# Patient Record
Sex: FEMALE | Race: WHITE | NOT HISPANIC OR LATINO | Employment: OTHER | URBAN - METROPOLITAN AREA
[De-identification: names, ages, dates, MRNs, and addresses within clinical notes are randomized per-mention and may not be internally consistent; named-entity substitution may affect disease eponyms.]

---

## 2017-02-15 ENCOUNTER — HOSPITAL ENCOUNTER (OUTPATIENT)
Dept: MAMMOGRAPHY | Facility: CLINIC | Age: 62
Discharge: HOME | End: 2017-02-15
Attending: OBSTETRICS & GYNECOLOGY | Admitting: OBSTETRICS & GYNECOLOGY
Payer: OTHER GOVERNMENT

## 2017-02-15 ENCOUNTER — HOSPITAL ENCOUNTER (OUTPATIENT)
Dept: ULTRASOUND IMAGING | Facility: CLINIC | Age: 62
End: 2017-02-15
Attending: OBSTETRICS & GYNECOLOGY
Payer: OTHER GOVERNMENT

## 2017-02-15 DIAGNOSIS — N63.0 BREAST NODULE: ICD-10-CM

## 2017-02-15 PROCEDURE — 76642 ULTRASOUND BREAST LIMITED: CPT | Mod: RT

## 2017-02-15 PROCEDURE — 77061 BREAST TOMOSYNTHESIS UNI: CPT

## 2017-05-16 ENCOUNTER — HOSPITAL ENCOUNTER (OUTPATIENT)
Dept: ULTRASOUND IMAGING | Facility: CLINIC | Age: 62
Discharge: HOME | End: 2017-05-16
Attending: OBSTETRICS & GYNECOLOGY | Admitting: OBSTETRICS & GYNECOLOGY
Payer: OTHER GOVERNMENT

## 2017-05-16 DIAGNOSIS — N60.01 BREAST CYST, RIGHT: ICD-10-CM

## 2017-05-16 PROCEDURE — 76642 ULTRASOUND BREAST LIMITED: CPT | Mod: RT

## 2024-11-02 ENCOUNTER — APPOINTMENT (OUTPATIENT)
Dept: CT IMAGING | Facility: CLINIC | Age: 69
DRG: 872 | End: 2024-11-02
Attending: EMERGENCY MEDICINE
Payer: MEDICARE

## 2024-11-02 ENCOUNTER — HOSPITAL ENCOUNTER (INPATIENT)
Facility: CLINIC | Age: 69
LOS: 3 days | Discharge: HOME | DRG: 872 | End: 2024-11-05
Attending: EMERGENCY MEDICINE | Admitting: INTERNAL MEDICINE
Payer: MEDICARE

## 2024-11-02 ENCOUNTER — APPOINTMENT (OUTPATIENT)
Dept: GENERAL RADIOLOGY | Facility: CLINIC | Age: 69
DRG: 872 | End: 2024-11-02
Attending: EMERGENCY MEDICINE
Payer: MEDICARE

## 2024-11-02 DIAGNOSIS — K57.92 ACUTE DIVERTICULITIS: ICD-10-CM

## 2024-11-02 DIAGNOSIS — A41.9 SEPSIS WITHOUT ACUTE ORGAN DYSFUNCTION, DUE TO UNSPECIFIED ORGANISM (H): ICD-10-CM

## 2024-11-02 DIAGNOSIS — K59.01 SLOW TRANSIT CONSTIPATION: Primary | ICD-10-CM

## 2024-11-02 LAB
ALBUMIN SERPL BCG-MCNC: 4.5 G/DL (ref 3.5–5.2)
ALBUMIN UR-MCNC: NEGATIVE MG/DL
ALP SERPL-CCNC: 73 U/L (ref 40–150)
ALT SERPL W P-5'-P-CCNC: 28 U/L (ref 0–50)
ANION GAP SERPL CALCULATED.3IONS-SCNC: 19 MMOL/L (ref 7–15)
APPEARANCE UR: CLEAR
AST SERPL W P-5'-P-CCNC: 18 U/L (ref 0–45)
BASOPHILS # BLD AUTO: 0 10E3/UL (ref 0–0.2)
BASOPHILS NFR BLD AUTO: 0 %
BILIRUB SERPL-MCNC: 0.4 MG/DL
BILIRUB UR QL STRIP: NEGATIVE
BUN SERPL-MCNC: 16 MG/DL (ref 8–23)
CALCIUM SERPL-MCNC: 10.2 MG/DL (ref 8.8–10.4)
CHLORIDE SERPL-SCNC: 102 MMOL/L (ref 98–107)
COLOR UR AUTO: ABNORMAL
CREAT SERPL-MCNC: 0.71 MG/DL (ref 0.51–0.95)
EGFRCR SERPLBLD CKD-EPI 2021: >90 ML/MIN/1.73M2
EOSINOPHIL # BLD AUTO: 0 10E3/UL (ref 0–0.7)
EOSINOPHIL NFR BLD AUTO: 0 %
ERYTHROCYTE [DISTWIDTH] IN BLOOD BY AUTOMATED COUNT: 13.3 % (ref 10–15)
GLUCOSE SERPL-MCNC: 174 MG/DL (ref 70–99)
GLUCOSE UR STRIP-MCNC: >=1000 MG/DL
HCO3 SERPL-SCNC: 21 MMOL/L (ref 22–29)
HCT VFR BLD AUTO: 49.9 % (ref 35–47)
HGB BLD-MCNC: 16.1 G/DL (ref 11.7–15.7)
HGB UR QL STRIP: NEGATIVE
IMM GRANULOCYTES # BLD: 0.1 10E3/UL
IMM GRANULOCYTES NFR BLD: 1 %
KETONES UR STRIP-MCNC: 100 MG/DL
LACTATE SERPL-SCNC: 1.3 MMOL/L (ref 0.7–2)
LEUKOCYTE ESTERASE UR QL STRIP: NEGATIVE
LYMPHOCYTES # BLD AUTO: 1 10E3/UL (ref 0.8–5.3)
LYMPHOCYTES NFR BLD AUTO: 5 %
MCH RBC QN AUTO: 28.9 PG (ref 26.5–33)
MCHC RBC AUTO-ENTMCNC: 32.3 G/DL (ref 31.5–36.5)
MCV RBC AUTO: 89 FL (ref 78–100)
MONOCYTES # BLD AUTO: 0.6 10E3/UL (ref 0–1.3)
MONOCYTES NFR BLD AUTO: 3 %
MUCOUS THREADS #/AREA URNS LPF: PRESENT /LPF
NEUTROPHILS # BLD AUTO: 18.9 10E3/UL (ref 1.6–8.3)
NEUTROPHILS NFR BLD AUTO: 92 %
NITRATE UR QL: NEGATIVE
NRBC # BLD AUTO: 0 10E3/UL
NRBC BLD AUTO-RTO: 0 /100
PH UR STRIP: 5 [PH] (ref 5–7)
PLATELET # BLD AUTO: 289 10E3/UL (ref 150–450)
POTASSIUM SERPL-SCNC: 4.3 MMOL/L (ref 3.4–5.3)
PROT SERPL-MCNC: 7.6 G/DL (ref 6.4–8.3)
RBC # BLD AUTO: 5.58 10E6/UL (ref 3.8–5.2)
RBC URINE: <1 /HPF
SODIUM SERPL-SCNC: 142 MMOL/L (ref 135–145)
SP GR UR STRIP: 1.02 (ref 1–1.03)
SQUAMOUS EPITHELIAL: 1 /HPF
UROBILINOGEN UR STRIP-MCNC: NORMAL MG/DL
WBC # BLD AUTO: 20.6 10E3/UL (ref 4–11)
WBC URINE: <1 /HPF

## 2024-11-02 PROCEDURE — 83036 HEMOGLOBIN GLYCOSYLATED A1C: CPT | Performed by: INTERNAL MEDICINE

## 2024-11-02 PROCEDURE — 81001 URINALYSIS AUTO W/SCOPE: CPT | Performed by: EMERGENCY MEDICINE

## 2024-11-02 PROCEDURE — 96375 TX/PRO/DX INJ NEW DRUG ADDON: CPT

## 2024-11-02 PROCEDURE — 99285 EMERGENCY DEPT VISIT HI MDM: CPT | Mod: 25

## 2024-11-02 PROCEDURE — 83605 ASSAY OF LACTIC ACID: CPT | Performed by: EMERGENCY MEDICINE

## 2024-11-02 PROCEDURE — 250N000011 HC RX IP 250 OP 636: Performed by: EMERGENCY MEDICINE

## 2024-11-02 PROCEDURE — 80053 COMPREHEN METABOLIC PANEL: CPT | Performed by: EMERGENCY MEDICINE

## 2024-11-02 PROCEDURE — 96374 THER/PROPH/DIAG INJ IV PUSH: CPT | Mod: 59

## 2024-11-02 PROCEDURE — 36415 COLL VENOUS BLD VENIPUNCTURE: CPT | Performed by: EMERGENCY MEDICINE

## 2024-11-02 PROCEDURE — 99222 1ST HOSP IP/OBS MODERATE 55: CPT | Mod: AI | Performed by: INTERNAL MEDICINE

## 2024-11-02 PROCEDURE — 85025 COMPLETE CBC W/AUTO DIFF WBC: CPT | Performed by: EMERGENCY MEDICINE

## 2024-11-02 PROCEDURE — 74018 RADEX ABDOMEN 1 VIEW: CPT

## 2024-11-02 PROCEDURE — 120N000001 HC R&B MED SURG/OB

## 2024-11-02 PROCEDURE — 74177 CT ABD & PELVIS W/CONTRAST: CPT | Mod: MG

## 2024-11-02 PROCEDURE — 258N000003 HC RX IP 258 OP 636: Performed by: EMERGENCY MEDICINE

## 2024-11-02 RX ORDER — KETOROLAC TROMETHAMINE 15 MG/ML
15 INJECTION, SOLUTION INTRAMUSCULAR; INTRAVENOUS ONCE
Status: COMPLETED | OUTPATIENT
Start: 2024-11-02 | End: 2024-11-02

## 2024-11-02 RX ORDER — MAGNESIUM HYDROXIDE/ALUMINUM HYDROXICE/SIMETHICONE 120; 1200; 1200 MG/30ML; MG/30ML; MG/30ML
15 SUSPENSION ORAL ONCE
Status: COMPLETED | OUTPATIENT
Start: 2024-11-02 | End: 2024-11-03

## 2024-11-02 RX ORDER — METRONIDAZOLE 500 MG/100ML
500 INJECTION, SOLUTION INTRAVENOUS ONCE
Status: DISCONTINUED | OUTPATIENT
Start: 2024-11-02 | End: 2024-11-02

## 2024-11-02 RX ORDER — CEFEPIME HYDROCHLORIDE 1 G/1
1 INJECTION, POWDER, FOR SOLUTION INTRAMUSCULAR; INTRAVENOUS EVERY 8 HOURS
Status: DISCONTINUED | OUTPATIENT
Start: 2024-11-03 | End: 2024-11-02

## 2024-11-02 RX ORDER — AMOXICILLIN 250 MG
1 CAPSULE ORAL 2 TIMES DAILY PRN
Status: DISCONTINUED | OUTPATIENT
Start: 2024-11-02 | End: 2024-11-05 | Stop reason: HOSPADM

## 2024-11-02 RX ORDER — ONDANSETRON 2 MG/ML
4 INJECTION INTRAMUSCULAR; INTRAVENOUS EVERY 6 HOURS PRN
Status: DISCONTINUED | OUTPATIENT
Start: 2024-11-02 | End: 2024-11-05 | Stop reason: HOSPADM

## 2024-11-02 RX ORDER — ACETAMINOPHEN 325 MG/1
975 TABLET ORAL EVERY 8 HOURS PRN
Status: DISCONTINUED | OUTPATIENT
Start: 2024-11-02 | End: 2024-11-05 | Stop reason: HOSPADM

## 2024-11-02 RX ORDER — ONDANSETRON 4 MG/1
4 TABLET, ORALLY DISINTEGRATING ORAL EVERY 6 HOURS PRN
Status: DISCONTINUED | OUTPATIENT
Start: 2024-11-02 | End: 2024-11-05 | Stop reason: HOSPADM

## 2024-11-02 RX ORDER — KETOROLAC TROMETHAMINE 15 MG/ML
15 INJECTION, SOLUTION INTRAMUSCULAR; INTRAVENOUS EVERY 6 HOURS PRN
Status: DISCONTINUED | OUTPATIENT
Start: 2024-11-02 | End: 2024-11-05 | Stop reason: HOSPADM

## 2024-11-02 RX ORDER — CALCIUM CARBONATE 500 MG/1
1000 TABLET, CHEWABLE ORAL 4 TIMES DAILY PRN
Status: DISCONTINUED | OUTPATIENT
Start: 2024-11-02 | End: 2024-11-05 | Stop reason: HOSPADM

## 2024-11-02 RX ORDER — CIPROFLOXACIN 2 MG/ML
400 INJECTION, SOLUTION INTRAVENOUS EVERY 12 HOURS
Status: DISCONTINUED | OUTPATIENT
Start: 2024-11-03 | End: 2024-11-05 | Stop reason: HOSPADM

## 2024-11-02 RX ORDER — LIDOCAINE 40 MG/G
CREAM TOPICAL
Status: DISCONTINUED | OUTPATIENT
Start: 2024-11-02 | End: 2024-11-05 | Stop reason: HOSPADM

## 2024-11-02 RX ORDER — AMOXICILLIN 250 MG
2 CAPSULE ORAL 2 TIMES DAILY PRN
Status: DISCONTINUED | OUTPATIENT
Start: 2024-11-02 | End: 2024-11-05 | Stop reason: HOSPADM

## 2024-11-02 RX ORDER — IOPAMIDOL 755 MG/ML
500 INJECTION, SOLUTION INTRAVASCULAR ONCE
Status: COMPLETED | OUTPATIENT
Start: 2024-11-02 | End: 2024-11-02

## 2024-11-02 RX ORDER — CEFEPIME HYDROCHLORIDE 1 G/1
1 INJECTION, POWDER, FOR SOLUTION INTRAMUSCULAR; INTRAVENOUS ONCE
Status: COMPLETED | OUTPATIENT
Start: 2024-11-02 | End: 2024-11-02

## 2024-11-02 RX ORDER — METRONIDAZOLE 500 MG/100ML
500 INJECTION, SOLUTION INTRAVENOUS EVERY 12 HOURS
Status: DISCONTINUED | OUTPATIENT
Start: 2024-11-02 | End: 2024-11-05 | Stop reason: HOSPADM

## 2024-11-02 RX ORDER — SODIUM CHLORIDE 9 MG/ML
INJECTION, SOLUTION INTRAVENOUS CONTINUOUS
Status: DISCONTINUED | OUTPATIENT
Start: 2024-11-03 | End: 2024-11-03

## 2024-11-02 RX ADMIN — IOPAMIDOL 78 ML: 755 INJECTION, SOLUTION INTRAVENOUS at 22:14

## 2024-11-02 RX ADMIN — SODIUM CHLORIDE 1000 ML: 9 INJECTION, SOLUTION INTRAVENOUS at 23:03

## 2024-11-02 RX ADMIN — KETOROLAC TROMETHAMINE 15 MG: 15 INJECTION, SOLUTION INTRAMUSCULAR; INTRAVENOUS at 21:43

## 2024-11-02 RX ADMIN — CEFEPIME HYDROCHLORIDE 1 G: 1 INJECTION, POWDER, FOR SOLUTION INTRAMUSCULAR; INTRAVENOUS at 23:01

## 2024-11-02 ASSESSMENT — COLUMBIA-SUICIDE SEVERITY RATING SCALE - C-SSRS
1. IN THE PAST MONTH, HAVE YOU WISHED YOU WERE DEAD OR WISHED YOU COULD GO TO SLEEP AND NOT WAKE UP?: NO
2. HAVE YOU ACTUALLY HAD ANY THOUGHTS OF KILLING YOURSELF IN THE PAST MONTH?: NO
6. HAVE YOU EVER DONE ANYTHING, STARTED TO DO ANYTHING, OR PREPARED TO DO ANYTHING TO END YOUR LIFE?: NO

## 2024-11-02 ASSESSMENT — ACTIVITIES OF DAILY LIVING (ADL)
ADLS_ACUITY_SCORE: 0

## 2024-11-03 LAB
ANION GAP SERPL CALCULATED.3IONS-SCNC: 12 MMOL/L (ref 7–15)
BUN SERPL-MCNC: 14.7 MG/DL (ref 8–23)
CALCIUM SERPL-MCNC: 8.7 MG/DL (ref 8.8–10.4)
CHLORIDE SERPL-SCNC: 111 MMOL/L (ref 98–107)
CREAT SERPL-MCNC: 0.65 MG/DL (ref 0.51–0.95)
EGFRCR SERPLBLD CKD-EPI 2021: >90 ML/MIN/1.73M2
ERYTHROCYTE [DISTWIDTH] IN BLOOD BY AUTOMATED COUNT: 13.6 % (ref 10–15)
EST. AVERAGE GLUCOSE BLD GHB EST-MCNC: 189 MG/DL
GLUCOSE BLDC GLUCOMTR-MCNC: 105 MG/DL (ref 70–99)
GLUCOSE BLDC GLUCOMTR-MCNC: 112 MG/DL (ref 70–99)
GLUCOSE BLDC GLUCOMTR-MCNC: 123 MG/DL (ref 70–99)
GLUCOSE BLDC GLUCOMTR-MCNC: 136 MG/DL (ref 70–99)
GLUCOSE BLDC GLUCOMTR-MCNC: 140 MG/DL (ref 70–99)
GLUCOSE BLDC GLUCOMTR-MCNC: 143 MG/DL (ref 70–99)
GLUCOSE BLDC GLUCOMTR-MCNC: 146 MG/DL (ref 70–99)
GLUCOSE SERPL-MCNC: 130 MG/DL (ref 70–99)
HBA1C MFR BLD: 8.2 %
HCO3 SERPL-SCNC: 20 MMOL/L (ref 22–29)
HCT VFR BLD AUTO: 42.6 % (ref 35–47)
HGB BLD-MCNC: 13.3 G/DL (ref 11.7–15.7)
MCH RBC QN AUTO: 28.8 PG (ref 26.5–33)
MCHC RBC AUTO-ENTMCNC: 31.2 G/DL (ref 31.5–36.5)
MCV RBC AUTO: 92 FL (ref 78–100)
PLATELET # BLD AUTO: 227 10E3/UL (ref 150–450)
POTASSIUM SERPL-SCNC: 3.9 MMOL/L (ref 3.4–5.3)
RBC # BLD AUTO: 4.62 10E6/UL (ref 3.8–5.2)
SODIUM SERPL-SCNC: 143 MMOL/L (ref 135–145)
WBC # BLD AUTO: 14.6 10E3/UL (ref 4–11)

## 2024-11-03 PROCEDURE — 258N000003 HC RX IP 258 OP 636: Performed by: INTERNAL MEDICINE

## 2024-11-03 PROCEDURE — 36415 COLL VENOUS BLD VENIPUNCTURE: CPT | Performed by: INTERNAL MEDICINE

## 2024-11-03 PROCEDURE — 99233 SBSQ HOSP IP/OBS HIGH 50: CPT | Performed by: STUDENT IN AN ORGANIZED HEALTH CARE EDUCATION/TRAINING PROGRAM

## 2024-11-03 PROCEDURE — 85018 HEMOGLOBIN: CPT | Performed by: INTERNAL MEDICINE

## 2024-11-03 PROCEDURE — 82962 GLUCOSE BLOOD TEST: CPT

## 2024-11-03 PROCEDURE — 250N000011 HC RX IP 250 OP 636: Performed by: INTERNAL MEDICINE

## 2024-11-03 PROCEDURE — 120N000001 HC R&B MED SURG/OB

## 2024-11-03 PROCEDURE — 80048 BASIC METABOLIC PNL TOTAL CA: CPT | Performed by: INTERNAL MEDICINE

## 2024-11-03 PROCEDURE — 82947 ASSAY GLUCOSE BLOOD QUANT: CPT | Performed by: INTERNAL MEDICINE

## 2024-11-03 PROCEDURE — 250N000013 HC RX MED GY IP 250 OP 250 PS 637: Performed by: INTERNAL MEDICINE

## 2024-11-03 PROCEDURE — 250N000013 HC RX MED GY IP 250 OP 250 PS 637: Performed by: EMERGENCY MEDICINE

## 2024-11-03 PROCEDURE — 250N000012 HC RX MED GY IP 250 OP 636 PS 637: Performed by: INTERNAL MEDICINE

## 2024-11-03 RX ORDER — NICOTINE POLACRILEX 4 MG
15-30 LOZENGE BUCCAL
Status: DISCONTINUED | OUTPATIENT
Start: 2024-11-03 | End: 2024-11-05 | Stop reason: HOSPADM

## 2024-11-03 RX ORDER — LEVOTHYROXINE SODIUM 137 MCG
137 TABLET ORAL DAILY
COMMUNITY
Start: 2024-05-04

## 2024-11-03 RX ORDER — EMPAGLIFLOZIN 25 MG/1
25 TABLET, FILM COATED ORAL DAILY
COMMUNITY
Start: 2024-06-03

## 2024-11-03 RX ORDER — DEXTROSE MONOHYDRATE 25 G/50ML
25-50 INJECTION, SOLUTION INTRAVENOUS
Status: DISCONTINUED | OUTPATIENT
Start: 2024-11-03 | End: 2024-11-05 | Stop reason: HOSPADM

## 2024-11-03 RX ORDER — ATORVASTATIN CALCIUM 10 MG/1
10 TABLET, FILM COATED ORAL DAILY
COMMUNITY
Start: 2024-10-11

## 2024-11-03 RX ORDER — DEXTROSE MONOHYDRATE 25 G/50ML
25-50 INJECTION, SOLUTION INTRAVENOUS
Status: DISCONTINUED | OUTPATIENT
Start: 2024-11-03 | End: 2024-11-03

## 2024-11-03 RX ORDER — NICOTINE POLACRILEX 4 MG
15-30 LOZENGE BUCCAL
Status: DISCONTINUED | OUTPATIENT
Start: 2024-11-03 | End: 2024-11-03

## 2024-11-03 RX ADMIN — LEVOTHYROXINE SODIUM 137 MCG: 0.11 TABLET ORAL at 06:00

## 2024-11-03 RX ADMIN — ONDANSETRON 4 MG: 2 INJECTION INTRAMUSCULAR; INTRAVENOUS at 01:17

## 2024-11-03 RX ADMIN — ACETAMINOPHEN 325MG 975 MG: 325 TABLET ORAL at 09:39

## 2024-11-03 RX ADMIN — KETOROLAC TROMETHAMINE 15 MG: 15 INJECTION, SOLUTION INTRAMUSCULAR; INTRAVENOUS at 05:59

## 2024-11-03 RX ADMIN — METRONIDAZOLE 500 MG: 500 INJECTION, SOLUTION INTRAVENOUS at 11:35

## 2024-11-03 RX ADMIN — CIPROFLOXACIN 400 MG: 2 INJECTION, SOLUTION INTRAVENOUS at 13:20

## 2024-11-03 RX ADMIN — INSULIN ASPART 1 UNITS: 100 INJECTION, SOLUTION INTRAVENOUS; SUBCUTANEOUS at 00:42

## 2024-11-03 RX ADMIN — SODIUM CHLORIDE: 9 INJECTION, SOLUTION INTRAVENOUS at 02:15

## 2024-11-03 RX ADMIN — METRONIDAZOLE 500 MG: 500 INJECTION, SOLUTION INTRAVENOUS at 00:05

## 2024-11-03 RX ADMIN — SODIUM CHLORIDE 1000 ML: 9 INJECTION, SOLUTION INTRAVENOUS at 00:25

## 2024-11-03 RX ADMIN — ACETAMINOPHEN 325MG 975 MG: 325 TABLET ORAL at 01:18

## 2024-11-03 RX ADMIN — ALUMINUM HYDROXIDE, MAGNESIUM HYDROXIDE, AND DIMETHICONE 15 ML: 200; 20; 200 SUSPENSION ORAL at 00:04

## 2024-11-03 RX ADMIN — CIPROFLOXACIN 400 MG: 2 INJECTION, SOLUTION INTRAVENOUS at 00:10

## 2024-11-03 NOTE — PHARMACY-ADMISSION MEDICATION HISTORY
Pharmacy Intern Admission Medication History    Admission medication history is complete. The information provided in this note is only as accurate as the sources available at the time of the update.    Information Source(s): Patient and CareEverywhere/SureScripts via in-person    Pertinent Information: none    Changes made to PTA medication list:  Added: All  Deleted: None  Changed: None    Allergies reviewed with patient and updates made in EHR: yes- Noted allergy to penicillins- patient does not remember reaction she had to medication.     Medication History Completed By: Shruti Garcia RPH 11/3/2024 8:41 AM    PTA Med List   Medication Sig Last Dose/Taking    atorvastatin (LIPITOR) 10 MG tablet Take 10 mg by mouth daily. 11/1/2024    JARDIANCE 25 MG TABS tablet Take 25 mg by mouth daily. 11/1/2024    SYNTHROID 137 MCG tablet Take 137 mcg by mouth daily. 11/1/2024

## 2024-11-03 NOTE — PROGRESS NOTES
"[facility]    Medicine Progress Note - Hospitalist Service    Date of Admission:  11/2/2024    Assessment & Plan   Marlee Bell is a 68 year old woman with PMH NIDDM, fatty liver, remote non-alcoholic pancreatitis who was admitted on 11/2/2024 with abdominal pain, tenesmus, nausea.     CT diagnostic of colitis vs diverticulitis.  Started on flagyl, cipro with improvement.       Abdominal pain & tenesmus  Sepsis 2/2 acute diverticulitis:  Abdominal pain, tenesmus, nausea without vomiting on the day of discharge.  No prior history of same.  Chronic history of constipation.  CT A/P shows inflammation of sigmoid colon compatible with colitis or diverticulitis.  Feeling improved after 12 hours of IV abx.  -Cipro/flagyl IV  -Clear liquid diet  -Monitor abdominal exam  -Tylenol & ketorolac for pain control  -Anticipate discharge in the next 24 hours pending ongoing improvement and good PO intake    T2DM: MDSSI    Hypothyroidism: PTA synthroid          Diet: Combination Diet Clear Liquid    DVT Prophylaxis: Pneumatic Compression Devices  Stovall Catheter: Not present  Lines: None     Cardiac Monitoring: None  Code Status: Full Code      Clinically Significant Risk Factors Present on Admission          # Hyperchloremia: Highest Cl = 111 mmol/L in last 2 days, will monitor as appropriate         # Anion Gap Metabolic Acidosis: Highest Anion Gap = 19 mmol/L in last 2 days, will monitor and treat as appropriate             # DMII: A1C = 8.2 % (Ref range: <5.7 %) within past 6 months    # Overweight: Estimated body mass index is 29.97 kg/m  as calculated from the following:    Height as of this encounter: 1.549 m (5' 1\").    Weight as of this encounter: 71.9 kg (158 lb 9.6 oz).              Disposition Plan     Medically Ready for Discharge: Anticipated Tomorrow             Kamalijt Ashton DO  Hospitalist Service  [facility]  Securely message with [employer] (more info)  Text page " via [employer] Paging/Directory   ______________________________________________________________________    Interval History   NAEo.  Patient feeling improved.  Still with abdominal pain with movement.  Feels well at rest.   Small loose stool this AM.  Non-bloody.  No fevers.     Physical Exam   Vital Signs: Temp: 98.6  F (37  C) Temp src: Oral BP: 117/55 Pulse: 68   Resp: 18 SpO2: 95 % O2 Device: None (Room air)    Weight: 158 lbs 9.6 oz    General Appearance: Patient awake & alert.  No apparent distress.   Respiratory: Lungs are CTAB.  Work of breathing appears normal on room air.  Cardiovascular: Regular rate and rhythm.  No murmurs rubs or gallops.  There is no edema present.  GI: Benign.  Soft.  Non-tender.  Bowel sounds active.   Skin: No rashes or lesions exposed skin.  Neuro:  The patient is moving all extremities.  No obvious focal asymmetries.   Other: Patient is appropriate and oriented x3.     Medical Decision Making       50 MINUTES SPENT BY ME on the date of service doing chart review, history, exam, documentation & further activities per the note.      Data   ------------------------- PAST 24 HR DATA REVIEWED -----------------------------------------------    I have personally reviewed the following data over the past 24 hrs:    14.6 (H)  \   13.3   / 227     143 111 (H) 14.7 /  123 (H)   3.9 20 (L) 0.65 \     ALT: 28 AST: 18 AP: 73 TBILI: 0.4   ALB: 4.5 TOT PROTEIN: 7.6 LIPASE: N/A     TSH: N/A T4: N/A A1C: 8.2 (H)     Procal: N/A CRP: N/A Lactic Acid: 1.3         Imaging results reviewed over the past 24 hrs:   Recent Results (from the past 24 hours)   XR Abdomen 1 View    Narrative    EXAM: XR ABDOMEN 1 VIEW  LOCATION: [facility]  DATE: 11/2/2024    INDICATION: abdominal pain, no bm in a couple of days.  COMPARISON: None.      Impression    IMPRESSION: Large amount of stool throughout the colon. Bowel gas pattern is normal. Nothing for obstruction or free air. No evidence for  renal stones. Pelvic phleboliths.   CT Abdomen Pelvis w Contrast    Narrative    EXAM: CT ABDOMEN PELVIS W CONTRAST  LOCATION: [facility]  DATE: 11/2/2024    INDICATION: lower abd pain, leukocytosis, tachycardic  COMPARISON: Abdomen radiograph today. MR of the abdomen 3/21/2022.  TECHNIQUE: CT scan of the abdomen and pelvis was performed following injection of IV contrast. Multiplanar reformats were obtained. Dose reduction techniques were used.  CONTRAST: 78 mL Isovue 370    FINDINGS:   LOWER CHEST: Small hiatal hernia.    HEPATOBILIARY: Tiny benign hepatic cysts. Normal gallbladder and bile ducts.    PANCREAS: Multiple tiny cysts of the pancreas were better demonstrated on prior MRI examination but do not appear to have significantly changed.    SPLEEN: Normal.    ADRENAL GLANDS: Normal.    KIDNEYS/BLADDER: Normal.    BOWEL: Colonic diverticulosis. Wall thickening and inflammation of the mid sigmoid colon has a differential diagnosis of colitis and diverticulitis. Moderate to large volume of colonic stool. Trace free pelvic fluid. No bowel obstruction, free air or   abscess. Normal appendix.    LYMPH NODES: Normal.    VASCULATURE: Mild aortoiliac atherosclerosis. No aneurysm    PELVIC ORGANS: Normal.    MUSCULOSKELETAL: Degenerative changes and slight convex left curvature of the lumbar spine.      Impression    IMPRESSION:   1.  Wall thickening and inflammation of the sigmoid colon has a differential diagnosis of diverticulitis and colitis. Follow-up is recommended to exclude an underlying mass.   2.  Multiple tiny cysts of the pancreas better demonstrated on prior MRI examination at which time they were thought to be due to prior pancreatitis.  3.  Small hiatal hernia.

## 2024-11-03 NOTE — ED PROVIDER NOTES
Emergency Department Note      History of Present Illness     Chief Complaint   Abdominal Pain      HPI   Marlee Bell is a 68 year old female with history of hypothyroidism, type 2 diabetes mellitus, dyslipidemia, and fatty liver who presents to the ED with her daughter for evaluation of abdominal pain. The patient reports sudden onset severe lower abdominal pain this morning at 0300. Marlee states she felt like she had to pass a stool but was unable to have a bowel movement. This persisted throughout the day as well as the severe pain. She notes 3 episodes of emesis but states this was likely due to the pain not nausea. She then had small, liquid stools. Marlee adds that the abdominal pain does wax and wane. She did not take her medications today as she was unable to tolerate PO intake. Endorses leg pain behind the right knee radiating inferiorly that started yesterday. Also notes dehydration. She has not had a formed bowel movement in 2 days. Denies fever, urinary frequency, dysuria, hematuria, bloody stools, or history of diverticulitis. No previous abdominal surgeries. She is not nauseous now in the ED. Of note, patient reports she had a colonoscopy in 2022.            Independent Historian   None    Review of External Notes   Reviewed colonoscopy from 2009 with notes of diverticulosis.    Past Medical History     Medical History and Problem List   Hypothyroidism  Cyst of pancreas  Dyslipidemia  Fatty liver  Type 2 diabetes mellitus  Vitamin D deficiency     Medications   Jardiance  Synthroid   Lipitor   Rybelsus     Surgical History   Tonsillectomy   Colonoscopy     Physical Exam     Patient Vitals for the past 24 hrs:   BP Temp Temp src Pulse Resp SpO2 Height Weight   11/03/24 0017 (!) 152/70 -- -- 101 -- 96 % -- --   11/02/24 2300 135/65 -- -- -- -- -- -- --   11/02/24 2245 (!) 151/71 -- -- -- -- -- -- --   11/02/24 2230 (!) 146/72 -- -- -- -- -- -- --   11/02/24 1917 132/87 98.6  F (37  C)  "Temporal 114 18 97 % 1.549 m (5' 1\") 69.9 kg (154 lb)     Physical Exam  Nursing note and vitals reviewed.  HENT:   Mouth/Throat: Moist mucous membranes.   Eyes: EOMI, nonicteric sclera  Cardiovascular: Mild tachycardia, regular rhythm, no murmurs, rubs, or gallops  Pulmonary/Chest: Effort normal and breath sounds normal. No respiratory distress. No wheezes. No rales.   Abdominal: Soft.  Mild bilateral lower quadrant tenderness to palpation, nondistended, no guarding or rigidity.   Musculoskeletal: Normal range of motion.   Neurological: Alert. Moves all extremities spontaneously.   Skin: Skin is warm and dry. No rash noted.         Diagnostics     Lab Results   Labs Ordered and Resulted from Time of ED Arrival to Time of ED Departure   COMPREHENSIVE METABOLIC PANEL - Abnormal       Result Value    Sodium 142      Potassium 4.3      Carbon Dioxide (CO2) 21 (*)     Anion Gap 19 (*)     Urea Nitrogen 16.0      Creatinine 0.71      GFR Estimate >90      Calcium 10.2      Chloride 102      Glucose 174 (*)     Alkaline Phosphatase 73      AST 18      ALT 28      Protein Total 7.6      Albumin 4.5      Bilirubin Total 0.4     CBC WITH PLATELETS AND DIFFERENTIAL - Abnormal    WBC Count 20.6 (*)     RBC Count 5.58 (*)     Hemoglobin 16.1 (*)     Hematocrit 49.9 (*)     MCV 89      MCH 28.9      MCHC 32.3      RDW 13.3      Platelet Count 289      % Neutrophils 92      % Lymphocytes 5      % Monocytes 3      % Eosinophils 0      % Basophils 0      % Immature Granulocytes 1      NRBCs per 100 WBC 0      Absolute Neutrophils 18.9 (*)     Absolute Lymphocytes 1.0      Absolute Monocytes 0.6      Absolute Eosinophils 0.0      Absolute Basophils 0.0      Absolute Immature Granulocytes 0.1      Absolute NRBCs 0.0     ROUTINE UA WITH MICROSCOPIC REFLEX TO CULTURE - Abnormal    Color Urine Light Yellow      Appearance Urine Clear      Glucose Urine >=1000 (*)     Bilirubin Urine Negative      Ketones Urine 100 (*)     Specific " Gravity Urine 1.020      Blood Urine Negative      pH Urine 5.0      Protein Albumin Urine Negative      Urobilinogen Urine Normal      Nitrite Urine Negative      Leukocyte Esterase Urine Negative      Mucus Urine Present (*)     RBC Urine <1      WBC Urine <1      Squamous Epithelials Urine 1     HEMOGLOBIN A1C - Abnormal    Estimated Average Glucose 189 (*)     Hemoglobin A1C 8.2 (*)    LACTIC ACID WHOLE BLOOD - Normal    Lactic Acid 1.3     GLUCOSE MONITOR NURSING POCT   GLUCOSE MONITOR NURSING POCT       Imaging   CT Abdomen Pelvis w Contrast   Final Result   IMPRESSION:    1.  Wall thickening and inflammation of the sigmoid colon has a differential diagnosis of diverticulitis and colitis. Follow-up is recommended to exclude an underlying mass.    2.  Multiple tiny cysts of the pancreas better demonstrated on prior MRI examination at which time they were thought to be due to prior pancreatitis.   3.  Small hiatal hernia.      XR Abdomen 1 View   Final Result   IMPRESSION: Large amount of stool throughout the colon. Bowel gas pattern is normal. Nothing for obstruction or free air. No evidence for renal stones. Pelvic phleboliths.        Independent Interpretation   None    ED Course      Medications Administered   Medications   levothyroxine (SYNTHROID/LEVOTHROID) tablet 137 mcg (has no administration in time range)   lidocaine 1 % 0.1-1 mL (has no administration in time range)   lidocaine (LMX4) cream (has no administration in time range)   sodium chloride (PF) 0.9% PF flush 3 mL (3 mLs Intracatheter $Given 11/3/24 0014)   sodium chloride (PF) 0.9% PF flush 3 mL (has no administration in time range)   senna-docusate (SENOKOT-S/PERICOLACE) 8.6-50 MG per tablet 1 tablet (has no administration in time range)     Or   senna-docusate (SENOKOT-S/PERICOLACE) 8.6-50 MG per tablet 2 tablet (has no administration in time range)   calcium carbonate (TUMS) chewable tablet 1,000 mg (has no administration in time range)    sodium chloride 0.9% BOLUS 1,000 mL (has no administration in time range)   sodium chloride 0.9 % infusion (has no administration in time range)   acetaminophen (TYLENOL) tablet 975 mg (has no administration in time range)   ketorolac (TORADOL) injection 15 mg (has no administration in time range)   ondansetron (ZOFRAN ODT) ODT tab 4 mg (has no administration in time range)     Or   ondansetron (ZOFRAN) injection 4 mg (has no administration in time range)   metroNIDAZOLE (FLAGYL) infusion 500 mg (500 mg Intravenous $New Bag 11/3/24 0005)   ciprofloxacin (CIPRO) infusion 400 mg (400 mg Intravenous $New Bag 11/3/24 0010)   glucose gel 15-30 g (has no administration in time range)     Or   dextrose 50 % injection 25-50 mL (has no administration in time range)     Or   glucagon injection 1 mg (has no administration in time range)   insulin aspart (NovoLOG) injection (RAPID ACTING) (has no administration in time range)   ketorolac (TORADOL) injection 15 mg (15 mg Intravenous $Given 11/2/24 2143)   sodium chloride (PF) 0.9% PF flush 100 mL (59 mLs Intravenous $Given 11/2/24 2214)   iopamidol (ISOVUE-370) solution 500 mL (78 mLs Intravenous $Given 11/2/24 2214)   ceFEPIme (MAXIPIME) 1 g vial to attach to  mL bag for ADULTS or NS 50 mL bag for PEDS (0 g Intravenous Stopped 11/2/24 2349)   sodium chloride 0.9% BOLUS 1,000 mL (0 mLs Intravenous Stopped 11/3/24 0014)   alum & mag hydroxide-simethicone (MAALOX) suspension 15 mL (15 mLs Oral $Given 11/3/24 0004)         Discussion of Management   Admitting Hospitalist, Dr. Hoffmann    ED Course   ED Course as of 11/03/24 0019   Sat Nov 02, 2024 2108 I obtained history and examined the patient as noted above.    2257 I rechecked and updated patient on plan for admission.   2300 I spoke with Dr. Hoffmann, Hospitalist, regarding the patient's history and presentation in the emergency department today. Accepting patient for admission.       Additional  Documentation  None    Medical Decision Making / Diagnosis        MIPS       None    [facility]   Marlee Bell is a 68 year old female who presents with bilateral lower quadrant abdominal pain.  Broad differential considered including diverticulitis, bowel obstruction, appendicitis, ovarian pathology, among many other etiologies.  Vital signs notable for mild tachycardia.  Labs notable for significant leukocytosis.  CT is notable for diverticulitis versus colitis.  As patient is not having diarrhea, infectious colitis unlikely.  Additionally, lactic acid level is normal essentially ruling out ischemic colitis.  Symptoms may represent stercoral colitis.  Patient has penicillin allergy therefore cefepime Flagyl ordered for coverage of possible diverticulitis.  Patient meets sepsis criteria, but no evidence of severe sepsis or septic shock.  Admission indicated for further evaluation and treatment.  Patient is in agreement with this plan.  All questions answered.  Discussed with hospital service, Dr. Hoffmann, who accepts patient for admission.    Disposition   The patient was admitted to the hospital.     Diagnosis     ICD-10-CM    1. Acute diverticulitis  K57.92       2. Sepsis without acute organ dysfunction, due to unspecified organism (H)  A41.9              Scribe Disclosure:  I, Stephenie Babcock, am serving as a scribe at 9:07 PM on 11/2/2024 to document services personally performed by Alex Amaro MD based on my observations and the provider's statements to me.        Alex Amaro MD  11/03/24 [id number]

## 2024-11-03 NOTE — ED NOTES
"[facility]  ED Nurse Handoff Report    ED Chief complaint: Abdominal Pain  . ED Diagnosis:   Final diagnoses:   Acute diverticulitis   Sepsis without acute organ dysfunction, due to unspecified organism (H)       Allergies:   Allergies   Allergen Reactions    Metformin Itching and GI Disturbance    Penicillins        Code Status: unknown    Activity level - Baseline/Home:  independent.  Activity Level - Current:   independent.   Lift room needed: No.   Bariatric: No   Needed: No   Isolation: No.   Infection: Not Applicable.     Respiratory status: Room air    Vital Signs (within 30 minutes):   Vitals:    11/02/24 1917 11/02/24 2230 11/02/24 2245 11/02/24 2300   BP: 132/87 (!) 146/72 (!) 151/71 135/65   Pulse: 114      Resp: 18      Temp: 98.6  F (37  C)      TempSrc: Temporal      SpO2: 97%      Weight: 69.9 kg (154 lb)      Height: 1.549 m (5' 1\")          Cardiac Rhythm:  ,      Pain level:    Patient confused: No.   Patient Falls Risk: patient and family education.   Elimination Status: Has voided     Patient Report - Initial Complaint: Arrives from home. States she has abdominal pain, states she unable to pass a stool. LBM was 2 days ago. States has had 3 episode of nausea. Also reports back pain and leg pain. Denies fevers at home. Denies medications prior to coming. Denies urinary symptoms.     Focused Assessment: A&Ox4. VSS on RA. Denies nausea, vomiting, SOB, CP. BLQ ABD pressure pain radiating to rectum, Ketoralac given.     Abnormal Results:   Labs Ordered and Resulted from Time of ED Arrival to Time of ED Departure   COMPREHENSIVE METABOLIC PANEL - Abnormal       Result Value    Sodium 142      Potassium 4.3      Carbon Dioxide (CO2) 21 (*)     Anion Gap 19 (*)     Urea Nitrogen 16.0      Creatinine 0.71      GFR Estimate >90      Calcium 10.2      Chloride 102      Glucose 174 (*)     Alkaline Phosphatase 73      AST 18      ALT 28      Protein Total 7.6      Albumin 4.5 "      Bilirubin Total 0.4     CBC WITH PLATELETS AND DIFFERENTIAL - Abnormal    WBC Count 20.6 (*)     RBC Count 5.58 (*)     Hemoglobin 16.1 (*)     Hematocrit 49.9 (*)     MCV 89      MCH 28.9      MCHC 32.3      RDW 13.3      Platelet Count 289      % Neutrophils 92      % Lymphocytes 5      % Monocytes 3      % Eosinophils 0      % Basophils 0      % Immature Granulocytes 1      NRBCs per 100 WBC 0      Absolute Neutrophils 18.9 (*)     Absolute Lymphocytes 1.0      Absolute Monocytes 0.6      Absolute Eosinophils 0.0      Absolute Basophils 0.0      Absolute Immature Granulocytes 0.1      Absolute NRBCs 0.0     ROUTINE UA WITH MICROSCOPIC REFLEX TO CULTURE - Abnormal    Color Urine Light Yellow      Appearance Urine Clear      Glucose Urine >=1000 (*)     Bilirubin Urine Negative      Ketones Urine 100 (*)     Specific Gravity Urine 1.020      Blood Urine Negative      pH Urine 5.0      Protein Albumin Urine Negative      Urobilinogen Urine Normal      Nitrite Urine Negative      Leukocyte Esterase Urine Negative      Mucus Urine Present (*)     RBC Urine <1      WBC Urine <1      Squamous Epithelials Urine 1     LACTIC ACID WHOLE BLOOD - Normal    Lactic Acid 1.3          CT Abdomen Pelvis w Contrast   Final Result   IMPRESSION:    1.  Wall thickening and inflammation of the sigmoid colon has a differential diagnosis of diverticulitis and colitis. Follow-up is recommended to exclude an underlying mass.    2.  Multiple tiny cysts of the pancreas better demonstrated on prior MRI examination at which time they were thought to be due to prior pancreatitis.   3.  Small hiatal hernia.      XR Abdomen 1 View   Final Result   IMPRESSION: Large amount of stool throughout the colon. Bowel gas pattern is normal. Nothing for obstruction or free air. No evidence for renal stones. Pelvic phleboliths.          Treatments provided: see imaging, labs, mar  Family Comments: daughter   OBS brochure/video discussed/provided to  patient:  No  ED Medications:   Medications   ceFEPIme (MAXIPIME) 1 g vial to attach to  mL bag for ADULTS or NS 50 mL bag for PEDS (1 g Intravenous $New Bag 11/2/24 2301)   metroNIDAZOLE (FLAGYL) infusion 500 mg (has no administration in time range)   sodium chloride 0.9% BOLUS 1,000 mL (1,000 mLs Intravenous $New Bag 11/2/24 2303)   ketorolac (TORADOL) injection 15 mg (15 mg Intravenous $Given 11/2/24 2143)   sodium chloride (PF) 0.9% PF flush 100 mL (59 mLs Intravenous $Given 11/2/24 2214)   iopamidol (ISOVUE-370) solution 500 mL (78 mLs Intravenous $Given 11/2/24 2214)       Drips infusing:  No  For the majority of the shift this patient was Green.   Interventions performed were n/a.    Sepsis treatment initiated: No    Cares/treatment/interventions/medications to be completed following ED care: see in-pt orders    ED Nurse Name: Michelle Gutiérrez RN  11:17 PM    RECEIVING UNIT ED HANDOFF REVIEW    Above ED Nurse Handoff Report was reviewed: Yes  Reviewed by: Mayra Scott RN on November 3, 2024 at 12:58 AM   I Danilo called the ED to inform them the note was read: Yes

## 2024-11-03 NOTE — PLAN OF CARE
"/66 (BP Location: Right arm)   Pulse 80   Temp 98.6  F (37  C) (Oral)   Resp 16   Ht 1.549 m (5' 1\")   Wt 71.9 kg (158 lb 9.6 oz)   SpO2 95%   BMI 29.97 kg/m      VSS on RA. Reports pain. Declined pain medications. PIV to right w/ NS at 100 ml/hr. Aox4. LS clear. Denies SOB and CP. No edema noted. Reports some abdominal pain. BS +. Last BM 11/3. Voiding spontaneously. Skin WDL. Clear liquid diet. Independent. B, 136. Plan: IVF. IV Cipro and IV Flagyl. Continue w/ POC.     Goal Outcome Evaluation:      Plan of Care Reviewed With: patient    Overall Patient Progress: improvingOverall Patient Progress: improving    Outcome Evaluation: Pt had x2 BMs. BG within range.      Problem: Adult Inpatient Plan of Care  Goal: Plan of Care Review  Description: The Plan of Care Review/Shift note should be completed every shift.  The Outcome Evaluation is a brief statement about your assessment that the patient is improving, declining, or no change.  This information will be displayed automatically on your shift  note.  Outcome: Progressing  Flowsheets (Taken 11/3/2024 0224)  Outcome Evaluation: Pt had x2 BMs. BG within range.  Plan of Care Reviewed With: patient  Overall Patient Progress: improving  Goal: Patient-Specific Goal (Individualized)  Description: You can add care plan individualizations to a care plan. Examples of Individualization might be:  \"Parent requests to be called daily at 9am for status\", \"I have a hard time hearing out of my right ear\", or \"Do not touch me to wake me up as it startles  me\".  Outcome: Progressing  Goal: Absence of Hospital-Acquired Illness or Injury  Outcome: Progressing  Intervention: Identify and Manage Fall Risk  Recent Flowsheet Documentation  Taken 11/3/2024 0202 by Mayra Scott, RN  Safety Promotion/Fall Prevention: safety round/check completed  Intervention: Prevent Skin Injury  Recent Flowsheet Documentation  Taken 11/3/2024 0202 by Mayra Scott, RN  Body " Position: position changed independently  Intervention: Prevent and Manage VTE (Venous Thromboembolism) Risk  Recent Flowsheet Documentation  Taken 11/3/2024 0202 by Mayra Scott RN  VTE Prevention/Management:   SCDs off (sequential compression devices)   patient refused intervention  Intervention: Prevent Infection  Recent Flowsheet Documentation  Taken 11/3/2024 0202 by Mayra Scott, RN  Infection Prevention:   single patient room provided   rest/sleep promoted   hand hygiene promoted   personal protective equipment utilized   equipment surfaces disinfected   environmental surveillance performed   cohorting utilized  Goal: Optimal Comfort and Wellbeing  Outcome: Progressing  Intervention: Monitor Pain and Promote Comfort  Recent Flowsheet Documentation  Taken 11/3/2024 0202 by Mayra Scott RN  Pain Management Interventions: declines  Goal: Readiness for Transition of Care  Outcome: Progressing  Intervention: Mutually Develop Transition Plan  Recent Flowsheet Documentation  Taken 11/3/2024 0156 by Myara Scott RN  Equipment Currently Used at Home: none     Problem: Bowel Elimination Management  Goal: Effective Bowel Elimination/Continence  Outcome: Progressing     Problem: Comorbidity Management  Goal: Blood Glucose Levels Within Targeted Range  Outcome: Progressing  Intervention: Monitor and Manage Glycemia  Recent Flowsheet Documentation  Taken 11/3/2024 0202 by Mayra Scott, RN  Medication Review/Management: medications reviewed

## 2024-11-03 NOTE — ED TRIAGE NOTES
Arrives from home. States she has abdominal pain, states she unable to pass a stool. LBM was 2 days ago.     States has had 3 episode of nausea.     Also reports back pain and leg pain.     Denies fevers at home. Denies medications prior to coming. Denies urinary symptoms.

## 2024-11-03 NOTE — H&P
"[facility] History and Physical    Marlee Bell MRN# [id number]   Age: 68 year old YOB: 1955     Date of Admission:  11/2/2024    Home clinic: [facility]  Primary care provider: Inez Lyon          Assessment and Plan:   Assessment:   Marlee Bell is a 68 year old woman who came to attention today due to tr pain and being \"unable to pass a stool\". She also reported significant nausea without vomiting.     PMH is notable for NIDDM, fatty liver, remote non-alcoholic pancreatitis.  Notably, the pt had a colonoscopy in 2022 when several small, adenomatous polyps were removed.     On presentation to the ED, VS: 132/87, , RR 18, T 98.6.  Exam: alert and well-nourished woman in NAD. Some discomfort intermittently and with direct palpation in the left lower quadrant without rebound or guarding.  Labs: Creat 0.71, normal electrolytes and LFTs. Glucose 174.  WBC 20.6, Hgb 16.1, Plt 289. UA negative.   Imaging: CT Abd/pelvis with contrast: \"Wall thickening and inflammation of the sigmoid colon has a differential diagnosis of diverticulitis and colitis. Follow-up is recommended to exclude an underlying mass.\"    DX:  Acute diverticulosis. CT findings are apparently not conclusive, but this appears very consistent with sigmoid diverticulitis.   Sepsis (based on WBC and tachycardia). Mild associated dehydration.   NIDDM.  Controlled on Jardiance 25 mg daily, but this is held at this time now that she is   Hypothyroidism.      Plan:   Admit to inpatient.   Ciprofloxacin and Metronidazole IV.    Will give 1 L NS bolus and NS infusion at 100 ml/hr for a liter. Also encourage clear liquids.   Consider repeat imaging if not markedly improved in the next several days.   ISS.             Chief Complaint:     Lower abd pain x 3 days     History is obtained from the patient, electronic health record, and emergency department physician     Marlee Bell reports " that she first noted suprapubic area pain about 3 days PTA.  The pain is intermittent but often very intense to the point where she has vomited due to the severity.  When the pain is worst, it feels as though she needs to pass a stool.  When she sits on the toilet, she is unable to pass any more than a small BM, but denies passing any blood or significant diarrhea. The volume of apparent formed stool has been very small.    No F/S/C.  Reports anorexia and emesis x 3 in the past day. No dizziness. Denies pain with urinating. Scant stool output, no blood or diarrhea. No vaginal bleeding.         Past Medical History:   NIDDM  Hypothyroidism  HLD         Past Surgical History:    No abdominal or pelvic surgeries.         Social History:   Non-smoker, non-drinker.          Family History:   No fam hx of colorectal cancer          Allergies:     Allergies   Allergen Reactions    Metformin Itching and GI Disturbance    Penicillins              Medications:   Jardiance 25 mg daily  Rosuvastatin 10 mg daily  Synthroid (PARMINDER) 137 mcg daily         Review of Systems:     A comprehensive review of systems was performed and found to be negative except as described in this note           Physical Exam:   Vitals were reviewed  Temp: 98.6  F (37  C) Temp src: Temporal BP: 135/65 Pulse: 114   Resp: 18 SpO2: 97 % O2 Device: None (Room air)    Constitutional: Awake, alert, cooperative, no apparent distress, and appears stated age.  Eyes: Lids and lashes normal, extra ocular muscles intact, sclera clear, conjunctiva normal.  ENT: Normocephalic, without obvious abnormality, atraumatic.  Neck: Supple, symmetrical, trachea midline, no adenopathy, thyroid symmetric, not enlarged and no tenderness, skin normal.  Hematologic / Lymphatic: No cervical lymphadenopathy and no supraclavicular lymphadenopathy.  Lungs: No increased work of breathing, good air exchange, clear to auscultation bilaterally, no crackles or wheezing.  Cardiovascular:  Regular rate and rhythm, normal S1 and S2, no S3 or S4, and no murmur noted.  Abdomen: Decreased bowel sounds, soft.  Suprapubic area pain, more prominent on the left htna th right. No rebound or guarding.   Musculoskeletal: No redness, warmth, or swelling of the joints. Tone is normal.  Neurologic: Awake, alert, oriented to name, place and time.  Cranial nerves II-XII are grossly intact.  Neuropsychiatric: Normal affect, mood, orientation, memory and insight.  Skin: No rashes, erythema, pallor, petechia or purpura.          Data:     Results for orders placed or performed during the hospital encounter of 11/02/24 (from the past 24 hours)   Comprehensive metabolic panel   Result Value Ref Range    Sodium 142 135 - 145 mmol/L    Potassium 4.3 3.4 - 5.3 mmol/L    Carbon Dioxide (CO2) 21 (L) 22 - 29 mmol/L    Anion Gap 19 (H) 7 - 15 mmol/L    Urea Nitrogen 16.0 8.0 - 23.0 mg/dL    Creatinine 0.71 0.51 - 0.95 mg/dL    GFR Estimate >90 >60 mL/min/1.73m2    Calcium 10.2 8.8 - 10.4 mg/dL    Chloride 102 98 - 107 mmol/L    Glucose 174 (H) 70 - 99 mg/dL    Alkaline Phosphatase 73 40 - 150 U/L    AST 18 0 - 45 U/L    ALT 28 0 - 50 U/L    Protein Total 7.6 6.4 - 8.3 g/dL    Albumin 4.5 3.5 - 5.2 g/dL    Bilirubin Total 0.4 <=1.2 mg/dL   CBC with Platelets & Differential    Narrative    The following orders were created for panel order CBC with Platelets & Differential.  Procedure                               Abnormality         Status                     ---------                               -----------         ------                     CBC with platelets and d...[[id number]]  Abnormal            Final result                 Please view results for these tests on the individual orders.   CBC with platelets and differential   Result Value Ref Range    WBC Count 20.6 (H) 4.0 - 11.0 10e3/uL    RBC Count 5.58 (H) 3.80 - 5.20 10e6/uL    Hemoglobin 16.1 (H) 11.7 - 15.7 g/dL    Hematocrit 49.9 (H) 35.0 - 47.0 %    MCV 89 78 - 100  fL    MCH 28.9 26.5 - 33.0 pg    MCHC 32.3 31.5 - 36.5 g/dL    RDW 13.3 10.0 - 15.0 %    Platelet Count 289 150 - 450 10e3/uL    % Neutrophils 92 %    % Lymphocytes 5 %    % Monocytes 3 %    % Eosinophils 0 %    % Basophils 0 %    % Immature Granulocytes 1 %    NRBCs per 100 WBC 0 <1 /100    Absolute Neutrophils 18.9 (H) 1.6 - 8.3 10e3/uL    Absolute Lymphocytes 1.0 0.8 - 5.3 10e3/uL    Absolute Monocytes 0.6 0.0 - 1.3 10e3/uL    Absolute Eosinophils 0.0 0.0 - 0.7 10e3/uL    Absolute Basophils 0.0 0.0 - 0.2 10e3/uL    Absolute Immature Granulocytes 0.1 <=0.4 10e3/uL    Absolute NRBCs 0.0 10e3/uL   XR Abdomen 1 View    Narrative    EXAM: XR ABDOMEN 1 VIEW  LOCATION: [facility]  DATE: 11/2/2024    INDICATION: abdominal pain, no bm in a couple of days.  COMPARISON: None.      Impression    IMPRESSION: Large amount of stool throughout the colon. Bowel gas pattern is normal. Nothing for obstruction or free air. No evidence for renal stones. Pelvic phleboliths.   Extra Tube ([place] Draw) *Canceled*    Narrative    The following orders were created for panel order Extra Tube ([place] Draw).  Procedure                               Abnormality         Status                     ---------                               -----------         ------                       Please view results for these tests on the individual orders.   UA with Microscopic reflex to Culture    Specimen: Urine, Clean Catch   Result Value Ref Range    Color Urine Light Yellow Colorless, Straw, Light Yellow, Yellow    Appearance Urine Clear Clear    Glucose Urine >=1000 (A) Negative mg/dL    Bilirubin Urine Negative Negative    Ketones Urine 100 (A) Negative mg/dL    Specific Gravity Urine 1.020 1.003 - 1.035    Blood Urine Negative Negative    pH Urine 5.0 5.0 - 7.0    Protein Albumin Urine Negative Negative mg/dL    Urobilinogen Urine Normal Normal, 2.0 mg/dL    Nitrite Urine Negative Negative    Leukocyte Esterase Urine  Negative Negative    Mucus Urine Present (A) None Seen /LPF    RBC Urine <1 <=2 /HPF    WBC Urine <1 <=5 /HPF    Squamous Epithelials Urine 1 <=1 /HPF    Narrative    Urine Culture not indicated   Lactic acid whole blood   Result Value Ref Range    Lactic Acid 1.3 0.7 - 2.0 mmol/L   CT Abdomen Pelvis w Contrast    Narrative    EXAM: CT ABDOMEN PELVIS W CONTRAST  LOCATION: [facility]  DATE: 11/2/2024    INDICATION: lower abd pain, leukocytosis, tachycardic  COMPARISON: Abdomen radiograph today. MR of the abdomen 3/21/2022.  TECHNIQUE: CT scan of the abdomen and pelvis was performed following injection of IV contrast. Multiplanar reformats were obtained. Dose reduction techniques were used.  CONTRAST: 78 mL Isovue 370    FINDINGS:   LOWER CHEST: Small hiatal hernia.    HEPATOBILIARY: Tiny benign hepatic cysts. Normal gallbladder and bile ducts.    PANCREAS: Multiple tiny cysts of the pancreas were better demonstrated on prior MRI examination but do not appear to have significantly changed.    SPLEEN: Normal.    ADRENAL GLANDS: Normal.    KIDNEYS/BLADDER: Normal.    BOWEL: Colonic diverticulosis. Wall thickening and inflammation of the mid sigmoid colon has a differential diagnosis of colitis and diverticulitis. Moderate to large volume of colonic stool. Trace free pelvic fluid. No bowel obstruction, free air or   abscess. Normal appendix.    LYMPH NODES: Normal.    VASCULATURE: Mild aortoiliac atherosclerosis. No aneurysm    PELVIC ORGANS: Normal.    MUSCULOSKELETAL: Degenerative changes and slight convex left curvature of the lumbar spine.      Impression    IMPRESSION:   1.  Wall thickening and inflammation of the sigmoid colon has a differential diagnosis of diverticulitis and colitis. Follow-up is recommended to exclude an underlying mass.   2.  Multiple tiny cysts of the pancreas better demonstrated on prior MRI examination at which time they were thought to be due to prior  pancreatitis.  3.  Small hiatal hernia.       All imaging studies reviewed by me.      Attestation:  I have reviewed today's vital signs, notes, medications, labs and imaging.     Vipin Hoffmann MD

## 2024-11-03 NOTE — PLAN OF CARE
"A/O4, VSS on RA. No BM during shift, passing flatus. Ind.     Goal Outcome Evaluation:      Plan of Care Reviewed With: patient    Overall Patient Progress: improvingOverall Patient Progress: improving    Outcome Evaluation: No pain, transitioned to full liquid diet w/stable BG checks      Problem: Adult Inpatient Plan of Care  Goal: Plan of Care Review  Description: The Plan of Care Review/Shift note should be completed every shift.  The Outcome Evaluation is a brief statement about your assessment that the patient is improving, declining, or no change.  This information will be displayed automatically on your shift  note.  11/3/2024 1721 by Lionel Madrigal RN  Outcome: Progressing  Flowsheets (Taken 11/3/2024 1721)  Outcome Evaluation: No pain, transitioned to full liquid diet w/stable BG checks  Plan of Care Reviewed With: patient  Overall Patient Progress: improving  11/3/2024 1720 by Lionel Madrigal RN  Outcome: Progressing  Flowsheets (Taken 11/3/2024 1720)  Outcome Evaluation: No pain, transitioned to full liquid diet  Overall Patient Progress: improving  Goal: Patient-Specific Goal (Individualized)  Description: You can add care plan individualizations to a care plan. Examples of Individualization might be:  \"Parent requests to be called daily at 9am for status\", \"I have a hard time hearing out of my right ear\", or \"Do not touch me to wake me up as it startles  me\".  11/3/2024 1721 by Lionel Madrigal RN  Outcome: Progressing  11/3/2024 1720 by Lionel Madrigal RN  Outcome: Progressing  Goal: Absence of Hospital-Acquired Illness or Injury  11/3/2024 1721 by Lionel Madrigal RN  Outcome: Progressing  11/3/2024 1720 by Lionel Madrigal RN  Outcome: Progressing  Intervention: Identify and Manage Fall Risk  Recent Flowsheet Documentation  Taken 11/3/2024 1143 by Lionel Madrigal RN  Safety Promotion/Fall Prevention:   treat underlying cause   treat reversible contributory factors   safety round/check " completed   room organization consistent   room near nurse's station   room door open  Intervention: Prevent Skin Injury  Recent Flowsheet Documentation  Taken 11/3/2024 1143 by Lionel Madrigal RN  Body Position: position changed independently  Intervention: Prevent and Manage VTE (Venous Thromboembolism) Risk  Recent Flowsheet Documentation  Taken 11/3/2024 1143 by Lionel Madrigal RN  VTE Prevention/Management: SCDs off (sequential compression devices)  Goal: Optimal Comfort and Wellbeing  11/3/2024 1721 by Lionel Madrigal RN  Outcome: Progressing  11/3/2024 1720 by Lionel Madrigal RN  Outcome: Progressing  Goal: Readiness for Transition of Care  11/3/2024 1721 by Lionel Madrigal RN  Outcome: Progressing  11/3/2024 1720 by Lionel Madrigal RN  Outcome: Progressing     Problem: Bowel Elimination Management  Goal: Effective Bowel Elimination/Continence  11/3/2024 1721 by Lionel Madrigal RN  Outcome: Progressing  11/3/2024 1720 by Lionel Madrigal RN  Outcome: Progressing     Problem: Comorbidity Management  Goal: Blood Glucose Levels Within Targeted Range  11/3/2024 1721 by Lionel Madrigal RN  Outcome: Progressing  11/3/2024 1720 by Lionel Madrigal RN  Outcome: Progressing  Intervention: Monitor and Manage Glycemia  Recent Flowsheet Documentation  Taken 11/3/2024 1143 by Lionel Madrigal RN  Medication Review/Management: medications reviewed     Problem: Infection  Goal: Absence of Infection Signs and Symptoms  11/3/2024 1721 by Lionel Madrigal RN  Outcome: Progressing  11/3/2024 1720 by Lionel Madrigal RN  Outcome: Progressing

## 2024-11-04 LAB
ANION GAP SERPL CALCULATED.3IONS-SCNC: 13 MMOL/L (ref 7–15)
BUN SERPL-MCNC: 15.9 MG/DL (ref 8–23)
CALCIUM SERPL-MCNC: 8.2 MG/DL (ref 8.8–10.4)
CHLORIDE SERPL-SCNC: 107 MMOL/L (ref 98–107)
CREAT SERPL-MCNC: 0.69 MG/DL (ref 0.51–0.95)
EGFRCR SERPLBLD CKD-EPI 2021: >90 ML/MIN/1.73M2
ERYTHROCYTE [DISTWIDTH] IN BLOOD BY AUTOMATED COUNT: 13.8 % (ref 10–15)
GLUCOSE BLDC GLUCOMTR-MCNC: 114 MG/DL (ref 70–99)
GLUCOSE BLDC GLUCOMTR-MCNC: 114 MG/DL (ref 70–99)
GLUCOSE BLDC GLUCOMTR-MCNC: 115 MG/DL (ref 70–99)
GLUCOSE BLDC GLUCOMTR-MCNC: 137 MG/DL (ref 70–99)
GLUCOSE BLDC GLUCOMTR-MCNC: 142 MG/DL (ref 70–99)
GLUCOSE BLDC GLUCOMTR-MCNC: 172 MG/DL (ref 70–99)
GLUCOSE SERPL-MCNC: 121 MG/DL (ref 70–99)
HCO3 SERPL-SCNC: 20 MMOL/L (ref 22–29)
HCT VFR BLD AUTO: 40.7 % (ref 35–47)
HGB BLD-MCNC: 12.9 G/DL (ref 11.7–15.7)
MCH RBC QN AUTO: 28.9 PG (ref 26.5–33)
MCHC RBC AUTO-ENTMCNC: 31.7 G/DL (ref 31.5–36.5)
MCV RBC AUTO: 91 FL (ref 78–100)
PLATELET # BLD AUTO: 210 10E3/UL (ref 150–450)
POTASSIUM SERPL-SCNC: 3.9 MMOL/L (ref 3.4–5.3)
RBC # BLD AUTO: 4.47 10E6/UL (ref 3.8–5.2)
SODIUM SERPL-SCNC: 140 MMOL/L (ref 135–145)
WBC # BLD AUTO: 10.1 10E3/UL (ref 4–11)

## 2024-11-04 PROCEDURE — 36415 COLL VENOUS BLD VENIPUNCTURE: CPT | Performed by: STUDENT IN AN ORGANIZED HEALTH CARE EDUCATION/TRAINING PROGRAM

## 2024-11-04 PROCEDURE — 250N000011 HC RX IP 250 OP 636: Performed by: INTERNAL MEDICINE

## 2024-11-04 PROCEDURE — 250N000013 HC RX MED GY IP 250 OP 250 PS 637: Performed by: INTERNAL MEDICINE

## 2024-11-04 PROCEDURE — 99232 SBSQ HOSP IP/OBS MODERATE 35: CPT | Performed by: STUDENT IN AN ORGANIZED HEALTH CARE EDUCATION/TRAINING PROGRAM

## 2024-11-04 PROCEDURE — 85018 HEMOGLOBIN: CPT | Performed by: STUDENT IN AN ORGANIZED HEALTH CARE EDUCATION/TRAINING PROGRAM

## 2024-11-04 PROCEDURE — 80048 BASIC METABOLIC PNL TOTAL CA: CPT | Performed by: STUDENT IN AN ORGANIZED HEALTH CARE EDUCATION/TRAINING PROGRAM

## 2024-11-04 PROCEDURE — 120N000001 HC R&B MED SURG/OB

## 2024-11-04 RX ORDER — ACETAMINOPHEN 325 MG/1
975 TABLET ORAL EVERY 8 HOURS PRN
Qty: 30 TABLET | Refills: 0 | Status: SHIPPED | OUTPATIENT
Start: 2024-11-04

## 2024-11-04 RX ORDER — POLYETHYLENE GLYCOL 3350 17 G/17G
17 POWDER, FOR SOLUTION ORAL DAILY PRN
Qty: 510 G | Refills: 0 | Status: SHIPPED | OUTPATIENT
Start: 2024-11-04

## 2024-11-04 RX ORDER — AMOXICILLIN 250 MG
1 CAPSULE ORAL 2 TIMES DAILY PRN
Qty: 30 TABLET | Refills: 0 | Status: SHIPPED | OUTPATIENT
Start: 2024-11-04

## 2024-11-04 RX ORDER — CIPROFLOXACIN 500 MG/1
500 TABLET, FILM COATED ORAL 2 TIMES DAILY
Qty: 20 TABLET | Refills: 0 | Status: SHIPPED | OUTPATIENT
Start: 2024-11-04 | End: 2024-11-14

## 2024-11-04 RX ORDER — METRONIDAZOLE 500 MG/1
500 TABLET ORAL 2 TIMES DAILY
Qty: 20 TABLET | Refills: 0 | Status: SHIPPED | OUTPATIENT
Start: 2024-11-04 | End: 2024-11-14

## 2024-11-04 RX ADMIN — CIPROFLOXACIN 400 MG: 2 INJECTION, SOLUTION INTRAVENOUS at 01:18

## 2024-11-04 RX ADMIN — METRONIDAZOLE 500 MG: 500 INJECTION, SOLUTION INTRAVENOUS at 11:10

## 2024-11-04 RX ADMIN — CIPROFLOXACIN 400 MG: 2 INJECTION, SOLUTION INTRAVENOUS at 12:49

## 2024-11-04 RX ADMIN — METRONIDAZOLE 500 MG: 500 INJECTION, SOLUTION INTRAVENOUS at 23:43

## 2024-11-04 RX ADMIN — LEVOTHYROXINE SODIUM 137 MCG: 0.11 TABLET ORAL at 06:08

## 2024-11-04 RX ADMIN — SENNOSIDES AND DOCUSATE SODIUM 1 TABLET: 50; 8.6 TABLET ORAL at 17:22

## 2024-11-04 RX ADMIN — METRONIDAZOLE 500 MG: 500 INJECTION, SOLUTION INTRAVENOUS at 00:18

## 2024-11-04 NOTE — PLAN OF CARE
"/57 (BP Location: Right arm)   Pulse 82   Temp 98.1  F (36.7  C) (Oral)   Resp 16   Ht 1.549 m (5' 1\")   Wt 71.9 kg (158 lb 9.6 oz)   SpO2 93%   BMI 29.97 kg/m      VS: VSS on RA.  Pain: Denies pain.  Lines: PIV SL.  Cognitive: Aox4.  Respiratory: LS clear. No SOB or MORENO reported.  Cardiac: Denies CP. Tele: SR.  Peripheral neurovascular: No edema. Pt denies numbness, tingling, and tenderness. Pulses 2+.  GI: Last BM 11/3. Denies N/V.  : Voiding spontaneously.   Skin: WDL. See flowsheet for assessment.   Diet: Full liquid.  Activity: Independent.   Labs: B.  Plan: IV Ciprofloxacin, IV Flagyl, advance diet. Likely discharge home today. Continue w/ POC.     Goal Outcome Evaluation:      Plan of Care Reviewed With: patient    Overall Patient Progress: improvingOverall Patient Progress: improving    Outcome Evaluation: Having good BMs. BG WDL. Afebrile.      Problem: Adult Inpatient Plan of Care  Goal: Plan of Care Review  Description: The Plan of Care Review/Shift note should be completed every shift.  The Outcome Evaluation is a brief statement about your assessment that the patient is improving, declining, or no change.  This information will be displayed automatically on your shift  note.  Outcome: Progressing  Flowsheets (Taken 2024)  Outcome Evaluation: Having good BMs. BG WDL. Afebrile.  Plan of Care Reviewed With: patient  Overall Patient Progress: improving  Goal: Patient-Specific Goal (Individualized)  Description: You can add care plan individualizations to a care plan. Examples of Individualization might be:  \"Parent requests to be called daily at 9am for status\", \"I have a hard time hearing out of my right ear\", or \"Do not touch me to wake me up as it startles  me\".  Outcome: Progressing  Goal: Absence of Hospital-Acquired Illness or Injury  Outcome: Progressing  Intervention: Identify and Manage Fall Risk  Recent Flowsheet Documentation  Taken 2024 by Mayra Scott " T, RN  Safety Promotion/Fall Prevention: safety round/check completed  Intervention: Prevent Skin Injury  Recent Flowsheet Documentation  Taken 11/4/2024 0022 by Mayra Scott RN  Body Position: position changed independently  Intervention: Prevent and Manage VTE (Venous Thromboembolism) Risk  Recent Flowsheet Documentation  Taken 11/4/2024 0023 by Mayra Scott RN  VTE Prevention/Management:   SCDs off (sequential compression devices)   patient refused intervention  Intervention: Prevent Infection  Recent Flowsheet Documentation  Taken 11/4/2024 0023 by Mayra Scott RN  Infection Prevention:   single patient room provided   rest/sleep promoted   personal protective equipment utilized   hand hygiene promoted   equipment surfaces disinfected   environmental surveillance performed   cohorting utilized  Goal: Optimal Comfort and Wellbeing  Outcome: Progressing  Goal: Readiness for Transition of Care  Outcome: Progressing     Problem: Bowel Elimination Management  Goal: Effective Bowel Elimination/Continence  Outcome: Adequate for Care Transition     Problem: Comorbidity Management  Goal: Blood Glucose Levels Within Targeted Range  Outcome: Adequate for Care Transition  Intervention: Monitor and Manage Glycemia  Recent Flowsheet Documentation  Taken 11/4/2024 0023 by Mayra Scott RN  Medication Review/Management: medications reviewed     Problem: Infection  Goal: Absence of Infection Signs and Symptoms  Outcome: Progressing

## 2024-11-04 NOTE — PLAN OF CARE
"Provided cares for pt from 2382-1036. Pt a/o x4. Denied pain. Pt c/o of feeling like she needs to have a BM but unable to. Encouraged pt to abmulate, pt ambulated in hallways and PRN senna given. SBA in room and in hallway, at 1500 pt started to c/o feeling shakey when walking. After walk in hallway pt stated shakiness when up and moving around has gone away. Blood sugar 114. Possible discharge home tomorrow.     /64 (BP Location: Right arm)   Pulse 78   Temp 98.4  F (36.9  C) (Oral)   Resp 16   Ht 1.549 m (5' 1\")   Wt 71.9 kg (158 lb 9.6 oz)   SpO2 95%   BMI 29.97 kg/m          Plan of Care Reviewed With: patient    Overall Patient Progress: improvingOverall Patient Progress: improving    Outcome Evaluation: Pt a/o x4. Denied pain. Pt c/o of feeling like she needs to have a BM but unable to. Encouraged pt to abmulate, pt ambulated in hallways and PRN senna given. SBA in room and in hallway, at 1500 pt started to c/o feeling shakey when walking. After walk pt stated shakiness when up and moving around has gone away.      Problem: Bowel Elimination Management  Goal: Effective Bowel Elimination/Continence  Outcome: Progressing     Problem: Adult Inpatient Plan of Care  Goal: Plan of Care Review  Description: The Plan of Care Review/Shift note should be completed every shift.  The Outcome Evaluation is a brief statement about your assessment that the patient is improving, declining, or no change.  This information will be displayed automatically on your shift  note.  Outcome: Progressing  Flowsheets (Taken 11/4/2024 0994)  Outcome Evaluation: Pt a/o x4. Denied pain. Pt c/o of feeling like she needs to have a BM but unable to. Encouraged pt to abmulate, pt ambulated in hallways and PRN senna given. SBA in room and in hallway, at 1500 pt started to c/o feeling shakey when walking. After walk pt stated shakiness when up and moving around has gone away.  Plan of Care Reviewed With: patient  Overall Patient " "Progress: improving  Goal: Patient-Specific Goal (Individualized)  Description: You can add care plan individualizations to a care plan. Examples of Individualization might be:  \"Parent requests to be called daily at 9am for status\", \"I have a hard time hearing out of my right ear\", or \"Do not touch me to wake me up as it startles  me\".  Outcome: Progressing  Goal: Absence of Hospital-Acquired Illness or Injury  Outcome: Progressing  Intervention: Identify and Manage Fall Risk  Recent Flowsheet Documentation  Taken 11/4/2024 1735 by Kiah Stark RN  Safety Promotion/Fall Prevention:   activity supervised   increase visualization of patient   increased rounding and observation   nonskid shoes/slippers when out of bed   room near nurse's station   room organization consistent   safety round/check completed   supervised activity  Intervention: Prevent Infection  Recent Flowsheet Documentation  Taken 11/4/2024 1735 by Kiah Stark RN  Infection Prevention:   equipment surfaces disinfected   hand hygiene promoted   personal protective equipment utilized   rest/sleep promoted   single patient room provided  Goal: Optimal Comfort and Wellbeing  Outcome: Progressing  Goal: Readiness for Transition of Care  Outcome: Progressing     Problem: Comorbidity Management  Goal: Blood Glucose Levels Within Targeted Range  Outcome: Progressing     Problem: Infection  Goal: Absence of Infection Signs and Symptoms  Outcome: Progressing     "

## 2024-11-04 NOTE — PLAN OF CARE
Goal Outcome Evaluation:       A&O x 4. VSS. Up independently. Denies pain, nausea or SOB. tolerating low fiber diet.  Abx. Flagyl and Cipro           Problem: Adult Inpatient Plan of Care  Goal: Plan of Care Review  Description: The Plan of Care Review/Shift note should be completed every shift.  The Outcome Evaluation is a brief statement about your assessment that the patient is improving, declining, or no change.  This information will be displayed automatically on your shift  note.  Recent Flowsheet Documentation  Taken 11/4/2024 1535 by Miriam Melgar RN  Outcome Evaluation: A&O x 4. VSS. Up independently. Denies pain, nausea or SOB. tolerating low fiber diet.  Abx. Flagyl and Cipro  Plan of Care Reviewed With: patient  Overall Patient Progress: improving  11/4/2024 1500 by Miriam Melgar RN  Outcome: Adequate for Care Transition  Goal: Absence of Hospital-Acquired Illness or Injury  Intervention: Identify and Manage Fall Risk  Recent Flowsheet Documentation  Taken 11/4/2024 1000 by Miriam Melgar RN  Safety Promotion/Fall Prevention:   activity supervised   clutter free environment maintained   nonskid shoes/slippers when out of bed   safety round/check completed  Intervention: Prevent Skin Injury  Recent Flowsheet Documentation  Taken 11/4/2024 1000 by Miriam Melgar RN  Body Position: position changed independently  Intervention: Prevent and Manage VTE (Venous Thromboembolism) Risk  Recent Flowsheet Documentation  Taken 11/4/2024 1300 by Miriam Melgar RN  VTE Prevention/Management: SCDs off (sequential compression devices)  Intervention: Prevent Infection  Recent Flowsheet Documentation  Taken 11/4/2024 1000 by Miriam Melgar RN  Infection Prevention:   hand hygiene promoted   single patient room provided     Problem: Comorbidity Management  Goal: Blood Glucose Levels Within Targeted Range  Intervention: Monitor and Manage Glycemia  Recent Flowsheet Documentation  Taken 11/4/2024 1000 by Ramón  Miriam, RN  Medication Review/Management: medications reviewed     Problem: Adult Inpatient Plan of Care  Goal: Plan of Care Review  Description: The Plan of Care Review/Shift note should be completed every shift.  The Outcome Evaluation is a brief statement about your assessment that the patient is improving, declining, or no change.  This information will be displayed automatically on your shift  note.  Outcome: Progressing  Flowsheets (Taken 11/4/2024 9330)  Outcome Evaluation: A&O x 4. VSS. Up independently. Denies pain, nausea or SOB. tolerating low fiber diet.  Abx. Flagyl and Cipro  Plan of Care Reviewed With: patient  Overall Patient Progress: improving

## 2024-11-04 NOTE — PROGRESS NOTES
"[facility]    Medicine Progress Note - Hospitalist Service    Date of Admission:  11/2/2024    Assessment & Plan   Marlee Bell is a 68 year old woman with PMH NIDDM, fatty liver, remote non-alcoholic pancreatitis who was admitted on 11/2/2024 with abdominal pain, tenesmus, nausea.     CT diagnostic of colitis vs diverticulitis.  Started on flagyl, cipro with improvement.       Abdominal pain & tenesmus  Sepsis 2/2 acute diverticulitis:  Abdominal pain, tenesmus, nausea without vomiting on the day of discharge.  No prior history of same.  Chronic history of constipation.  CT A/P shows inflammation of sigmoid colon compatible with colitis or diverticulitis.  Feeling improved after 12 hours of IV abx.  -Cipro/flagyl IV  -Low fiber diet  -Monitor abdominal exam  -Tylenol & ketorolac for pain control  -Ok to discharge in next 24 hours if doing well with low fiber diet and has BM    T2DM: MDSSI    Hypothyroidism: PTA synthroid          Diet: Low Fiber Diet  Diet    DVT Prophylaxis: Pneumatic Compression Devices  Stoavll Catheter: Not present  Lines: None     Cardiac Monitoring: None  Code Status: Full Code      Clinically Significant Risk Factors          # Hyperchloremia: Highest Cl = 111 mmol/L in last 2 days, will monitor as appropriate      # Hypocalcemia: Lowest Ca = 8.2 mg/dL in last 2 days, will monitor and replace as appropriate    # Anion Gap Metabolic Acidosis: Highest Anion Gap = 19 mmol/L in last 2 days, will monitor and treat as appropriate               # DMII: A1C = 8.2 % (Ref range: <5.7 %) within past 6 months, PRESENT ON ADMISSION  # Overweight: Estimated body mass index is 29.97 kg/m  as calculated from the following:    Height as of this encounter: 1.549 m (5' 1\").    Weight as of this encounter: 71.9 kg (158 lb 9.6 oz)., PRESENT ON ADMISSION            Disposition Plan     Medically Ready for Discharge: Anticipated Tomorrow             Kamaljit Ashton DO  Hospitalist " [facility]  Securely message with [employer] (more info)  Text page via [facility] Paging/Directory   ______________________________________________________________________    Interval History   NAEo.  Patient feeling improved.  Still with cramping and weakness this morning.  Trial diet today.  Discharge in the next 24 hours pending tolerance.     Physical Exam   Vital Signs: Temp: 98  F (36.7  C) Temp src: Oral BP: 126/47 Pulse: 66   Resp: 16 SpO2: 97 % O2 Device: None (Room air)    Weight: 158 lbs 9.6 oz    General Appearance: Patient awake & alert.  No apparent distress.   Respiratory: Lungs are CTAB.  Work of breathing appears normal on room air.  Cardiovascular: Regular rate and rhythm.  No murmurs rubs or gallops.  There is no edema present.  GI: Benign.  Soft.  Non-tender.  Bowel sounds active.   Skin: No rashes or lesions exposed skin.  Neuro:  The patient is moving all extremities.  No obvious focal asymmetries.   Other: Patient is appropriate and oriented x3.     Medical Decision Making       40 MINUTES SPENT BY ME on the date of service doing chart review, history, exam, documentation & further activities per the note.      Data   ------------------------- PAST 24 HR DATA REVIEWED -----------------------------------------------    I have personally reviewed the following data over the past 24 hrs:    10.1  \   12.9   / 210     140 107 15.9 /  114 (H)   3.9 20 (L) 0.69 \       Imaging results reviewed over the past 24 hrs:   No results found for this or any previous visit (from the past 24 hours).

## 2024-11-05 ENCOUNTER — APPOINTMENT (OUTPATIENT)
Dept: GENERAL RADIOLOGY | Facility: CLINIC | Age: 69
DRG: 872 | End: 2024-11-05
Attending: STUDENT IN AN ORGANIZED HEALTH CARE EDUCATION/TRAINING PROGRAM
Payer: MEDICARE

## 2024-11-05 VITALS
DIASTOLIC BLOOD PRESSURE: 67 MMHG | HEART RATE: 63 BPM | HEIGHT: 61 IN | WEIGHT: 158.6 LBS | SYSTOLIC BLOOD PRESSURE: 152 MMHG | BODY MASS INDEX: 29.94 KG/M2 | RESPIRATION RATE: 16 BRPM | TEMPERATURE: 98.1 F | OXYGEN SATURATION: 97 %

## 2024-11-05 LAB
ALBUMIN UR-MCNC: NEGATIVE MG/DL
ANION GAP SERPL CALCULATED.3IONS-SCNC: 14 MMOL/L (ref 7–15)
APPEARANCE UR: CLEAR
BILIRUB UR QL STRIP: NEGATIVE
BUN SERPL-MCNC: 14.1 MG/DL (ref 8–23)
CALCIUM SERPL-MCNC: 9 MG/DL (ref 8.8–10.4)
CHLORIDE SERPL-SCNC: 104 MMOL/L (ref 98–107)
COLOR UR AUTO: ABNORMAL
CREAT SERPL-MCNC: 0.64 MG/DL (ref 0.51–0.95)
EGFRCR SERPLBLD CKD-EPI 2021: >90 ML/MIN/1.73M2
ERYTHROCYTE [DISTWIDTH] IN BLOOD BY AUTOMATED COUNT: 13.5 % (ref 10–15)
GLUCOSE BLDC GLUCOMTR-MCNC: 127 MG/DL (ref 70–99)
GLUCOSE BLDC GLUCOMTR-MCNC: 147 MG/DL (ref 70–99)
GLUCOSE BLDC GLUCOMTR-MCNC: 149 MG/DL (ref 70–99)
GLUCOSE SERPL-MCNC: 159 MG/DL (ref 70–99)
GLUCOSE UR STRIP-MCNC: 500 MG/DL
HCO3 SERPL-SCNC: 19 MMOL/L (ref 22–29)
HCT VFR BLD AUTO: 44.3 % (ref 35–47)
HGB BLD-MCNC: 14.3 G/DL (ref 11.7–15.7)
HGB UR QL STRIP: NEGATIVE
KETONES UR STRIP-MCNC: 40 MG/DL
LEUKOCYTE ESTERASE UR QL STRIP: NEGATIVE
MCH RBC QN AUTO: 28.6 PG (ref 26.5–33)
MCHC RBC AUTO-ENTMCNC: 32.3 G/DL (ref 31.5–36.5)
MCV RBC AUTO: 89 FL (ref 78–100)
NITRATE UR QL: NEGATIVE
PH UR STRIP: 5.5 [PH] (ref 5–7)
PLATELET # BLD AUTO: 215 10E3/UL (ref 150–450)
POTASSIUM SERPL-SCNC: 3.7 MMOL/L (ref 3.4–5.3)
RBC # BLD AUTO: 5 10E6/UL (ref 3.8–5.2)
RBC URINE: 0 /HPF
SODIUM SERPL-SCNC: 137 MMOL/L (ref 135–145)
SP GR UR STRIP: 1.01 (ref 1–1.03)
SQUAMOUS EPITHELIAL: <1 /HPF
UROBILINOGEN UR STRIP-MCNC: NORMAL MG/DL
WBC # BLD AUTO: 8.4 10E3/UL (ref 4–11)
WBC URINE: 1 /HPF

## 2024-11-05 PROCEDURE — 99239 HOSP IP/OBS DSCHRG MGMT >30: CPT | Performed by: STUDENT IN AN ORGANIZED HEALTH CARE EDUCATION/TRAINING PROGRAM

## 2024-11-05 PROCEDURE — 74018 RADEX ABDOMEN 1 VIEW: CPT

## 2024-11-05 PROCEDURE — 81003 URINALYSIS AUTO W/O SCOPE: CPT | Performed by: STUDENT IN AN ORGANIZED HEALTH CARE EDUCATION/TRAINING PROGRAM

## 2024-11-05 PROCEDURE — 85014 HEMATOCRIT: CPT | Performed by: STUDENT IN AN ORGANIZED HEALTH CARE EDUCATION/TRAINING PROGRAM

## 2024-11-05 PROCEDURE — 250N000011 HC RX IP 250 OP 636: Performed by: INTERNAL MEDICINE

## 2024-11-05 PROCEDURE — 80048 BASIC METABOLIC PNL TOTAL CA: CPT | Performed by: STUDENT IN AN ORGANIZED HEALTH CARE EDUCATION/TRAINING PROGRAM

## 2024-11-05 PROCEDURE — 36415 COLL VENOUS BLD VENIPUNCTURE: CPT | Performed by: STUDENT IN AN ORGANIZED HEALTH CARE EDUCATION/TRAINING PROGRAM

## 2024-11-05 PROCEDURE — 250N000013 HC RX MED GY IP 250 OP 250 PS 637: Performed by: INTERNAL MEDICINE

## 2024-11-05 RX ADMIN — SENNOSIDES AND DOCUSATE SODIUM 1 TABLET: 50; 8.6 TABLET ORAL at 10:25

## 2024-11-05 RX ADMIN — CIPROFLOXACIN 400 MG: 2 INJECTION, SOLUTION INTRAVENOUS at 13:21

## 2024-11-05 RX ADMIN — LEVOTHYROXINE SODIUM 137 MCG: 0.11 TABLET ORAL at 05:33

## 2024-11-05 RX ADMIN — KETOROLAC TROMETHAMINE 15 MG: 15 INJECTION, SOLUTION INTRAMUSCULAR; INTRAVENOUS at 08:05

## 2024-11-05 RX ADMIN — CIPROFLOXACIN 400 MG: 2 INJECTION, SOLUTION INTRAVENOUS at 00:52

## 2024-11-05 RX ADMIN — ACETAMINOPHEN 325MG 975 MG: 325 TABLET ORAL at 07:35

## 2024-11-05 RX ADMIN — METRONIDAZOLE 500 MG: 500 INJECTION, SOLUTION INTRAVENOUS at 11:54

## 2024-11-05 ASSESSMENT — ACTIVITIES OF DAILY LIVING (ADL)
ADLS_ACUITY_SCORE: 0

## 2024-11-05 NOTE — PLAN OF CARE
Shift summary ([id number])    Pt Ox4. VSS except elevated BP on RA. See MAR for analgesic regimen for reported lower abdominal pain. Denies CP, SOB. RPIV intact, SL. Up ad andriy in room, steady gait, denies dizziness. Had abd XR this AM.     Goal Outcome Evaluation:      Plan of Care Reviewed With: patient    Overall Patient Progress: no changeOverall Patient Progress: no change    Outcome Evaluation: Pt endorsing abd pain/cramping this AM, improved w/ PO tylenol and toradol. Abd xray obtained, heat applied. Discharge pending abd pain and BM t/o the day.      Problem: Adult Inpatient Plan of Care  Goal: Plan of Care Review  Description: The Plan of Care Review/Shift note should be completed every shift.  The Outcome Evaluation is a brief statement about your assessment that the patient is improving, declining, or no change.  This information will be displayed automatically on your shift  note.  Recent Flowsheet Documentation  Taken 11/5/2024 1057 by Rosy Tejada RN  Outcome Evaluation: Pt endorsing abd pain/cramping this AM, improved w/ PO tylenol and toradol. Abd xray obtained, heat applied. Discharge pending abd pain and BM t/o the day.  Plan of Care Reviewed With: patient  Overall Patient Progress: no change  Goal: Absence of Hospital-Acquired Illness or Injury  Intervention: Identify and Manage Fall Risk  Recent Flowsheet Documentation  Taken 11/5/2024 0805 by Rosy Tejada RN  Safety Promotion/Fall Prevention: (MD at bedside) --  Taken 11/5/2024 0741 by Rosy Tejada RN  Safety Promotion/Fall Prevention:   assistive device/personal items within reach   clutter free environment maintained   increased rounding and observation   increase visualization of patient   lighting adjusted   mobility aid in reach   nonskid shoes/slippers when out of bed   patient and family education   room near nurse's station   room organization consistent   safety round/check completed   treat reversible  contributory factors   treat underlying cause  Taken 11/5/2024 0715 by Rosy Tejada RN  Safety Promotion/Fall Prevention: safety round/check completed  Intervention: Prevent Skin Injury  Recent Flowsheet Documentation  Taken 11/5/2024 0741 by Rosy Tejada RN  Body Position:   position changed independently   supine, head elevated  Skin Protection: adhesive use limited  Intervention: Prevent and Manage VTE (Venous Thromboembolism) Risk  Recent Flowsheet Documentation  Taken 11/5/2024 0741 by Rosy Tejada RN  VTE Prevention/Management: SCDs off (sequential compression devices)  Intervention: Prevent Infection  Recent Flowsheet Documentation  Taken 11/5/2024 0741 by Rosy Tejada RN  Infection Prevention:   equipment surfaces disinfected   hand hygiene promoted   personal protective equipment utilized   rest/sleep promoted   single patient room provided  Goal: Optimal Comfort and Wellbeing  Intervention: Monitor Pain and Promote Comfort  Recent Flowsheet Documentation  Taken 11/5/2024 1011 by Rosy Tejada RN  Pain Management Interventions:   rest   heat applied  Taken 11/5/2024 0803 by Rosy Tejada RN  Pain Management Interventions: medication (see MAR)  Taken 11/5/2024 0738 by Rosy Tejada RN  Pain Management Interventions: (heating pad ordered) --  Taken 11/5/2024 0732 by Rosy Tejada RN  Pain Management Interventions: medication (see MAR)

## 2024-11-05 NOTE — DISCHARGE SUMMARY
"[facility]  Hospitalist Discharge Summary      Date of Admission:  11/2/2024  Date of Discharge:  11/5/2024  4:28 PM  Discharging Provider: Kamaljit Ashton DO  Discharge Service: Hospitalist Service    Discharge Diagnoses   Marlee Bell is a 68 year old woman with PMH NIDDM, fatty liver, remote non-alcoholic pancreatitis who was admitted on 11/2/2024 with abdominal pain, tenesmus, nausea.      CT diagnostic of colitis vs diverticulitis.  Started on flagyl, cipro with improvement.  Abdominal pain was resolved, she was tolerating low fiber diet prior to discharge.  She will discharge home with 10 more days of cipro flagyl tablets.         Abdominal pain & tenesmus  Sepsis 2/2 acute diverticulitis:  Abdominal pain, tenesmus, nausea without vomiting on the day of discharge.  No prior history of same.  Chronic history of constipation.  CT A/P shows inflammation of sigmoid colon compatible with colitis or diverticulitis.  Feeling improved.  Did have some abdominal pain on the morning of discharge but this was related to constipation - large BM prior to discharge with relief.   -10 more days of cipro/flagyl on discharge  -Senna-colace for constipation  Recommend CT scan and colonoscopy in 6-8 weeks     T2DM: MDSSI     Hypothyroidism: PTA synthroid    Clinically Significant Risk Factors     # DMII: A1C = 8.2 % (Ref range: <5.7 %) within past 6 months  # Overweight: Estimated body mass index is 29.97 kg/m  as calculated from the following:    Height as of this encounter: 1.549 m (5' 1\").    Weight as of this encounter: 71.9 kg (158 lb 9.6 oz).       Follow-ups Needed After Discharge   Follow-up Appointments       Follow-up and recommended labs and tests       Follow up with primary care provider, Inez Lyon, within 7 days for hospital follow- up.  The following labs/tests are recommended: Colonoscopy in 6-8 weeks.  Repeat CT scan in the future to rule out presence of colon mass.          "       Unresulted Labs Ordered in the Past 30 Days of this Admission       No orders found from 10/3/2024 to 11/3/2024.        These results will be followed up by NA    Discharge Disposition   Discharged to home  Condition at discharge: Good    Consultations This Hospital Stay   None    Code Status   Full Code    Time Spent on this Encounter   I, Kamaljit Ashton DO, personally saw the patient today and spent greater than 30 minutes discharging this patient.       Kamaljit Ashton DO  [place] [id number] [facility]  [address] [place] [address]  Phone: [phone]  Fax: [phone]  ______________________________________________________________________    Physical Exam   Vital Signs: Temp: 98.1  F (36.7  C) Temp src: Axillary BP: (!) 152/67 Pulse: 63   Resp: 16 SpO2: 97 % O2 Device: None (Room air)    Weight: 158 lbs 9.6 oz  General Appearance: Patient awake & alert.  No apparent distress.   Respiratory: Lungs are CTAB.  Work of breathing appears normal on room air.  Cardiovascular: Regular rate and rhythm.  No murmurs rubs or gallops.  There is no edema present.  GI: Benign.  Soft.  Mild tenderness in the suprapubic region.  Bowel sounds active.   Skin: No rashes or lesions exposed skin.  Neuro:  The patient is moving all extremities.  No obvious focal asymmetries.   Other: Patient is appropriate and oriented x3.        Primary Care Physician   Inez Lyon    Discharge Orders      Reason for your hospital stay    You were hospitalized for acute diverticulitis.  You were treated with IV antibiotics and bowel rest with improvement.     Follow-up and recommended labs and tests     Follow up with primary care provider, Inez Lyon, within 7 days for hospital follow- up.  The following labs/tests are recommended: Colonoscopy in 6-8 weeks.  Repeat CT scan in the future to rule out presence of colon mass.     Activity    Your activity upon discharge: activity as tolerated      Diet    Follow this diet upon discharge:  Slowly advance to regular diet as tolerated       Significant Results and Procedures   Most Recent 3 CBC's:  Recent Labs   Lab Test 11/05/24  0914 11/04/24  0603 11/03/24  0618   WBC 8.4 10.1 14.6*   HGB 14.3 12.9 13.3   MCV 89 91 92    210 227     Most Recent 3 BMP's:  Recent Labs   Lab Test 11/05/24  1030 11/05/24  0914 11/05/24  0817 11/04/24  0738 11/04/24  0603 11/03/24  0955 11/03/24  0618   NA  --  137  --   --  140  --  143   POTASSIUM  --  3.7  --   --  3.9  --  3.9   CHLORIDE  --  104  --   --  107  --  111*   CO2  --  19*  --   --  20*  --  20*   BUN  --  14.1  --   --  15.9  --  14.7   CR  --  0.64  --   --  0.69  --  0.65   ANIONGAP  --  14  --   --  13  --  12   SOCRATES  --  9.0  --   --  8.2*  --  8.7*   * 159* 149*   < > 121*   < > 130*    < > = values in this interval not displayed.       Discharge Medications   Current Discharge Medication List        START taking these medications    Details   acetaminophen (TYLENOL) 325 MG tablet Take 3 tablets (975 mg) by mouth every 8 hours as needed for mild pain, fever or headaches.  Qty: 30 tablet, Refills: 0    Associated Diagnoses: Acute diverticulitis      ciprofloxacin (CIPRO) 500 MG tablet Take 1 tablet (500 mg) by mouth 2 times daily for 10 days.  Qty: 20 tablet, Refills: 0    Associated Diagnoses: Acute diverticulitis; Sepsis without acute organ dysfunction, due to unspecified organism (H)      metroNIDAZOLE (FLAGYL) 500 MG tablet Take 1 tablet (500 mg) by mouth 2 times daily for 10 days.  Qty: 20 tablet, Refills: 0    Associated Diagnoses: Acute diverticulitis; Sepsis without acute organ dysfunction, due to unspecified organism (H)      polyethylene glycol (MIRALAX) 17 GM/Dose powder Take 17 g by mouth daily as needed for constipation (Use for constipation if not relieved with senna-colace).  Qty: 510 g, Refills: 0    Associated Diagnoses: Slow transit constipation      senna-docusate  (SENOKOT-S/PERICOLACE) 8.6-50 MG tablet Take 1 tablet by mouth 2 times daily as needed for constipation.  Qty: 30 tablet, Refills: 0    Associated Diagnoses: Slow transit constipation           CONTINUE these medications which have NOT CHANGED    Details   atorvastatin (LIPITOR) 10 MG tablet Take 10 mg by mouth daily.      JARDIANCE 25 MG TABS tablet Take 25 mg by mouth daily.      SYNTHROID 137 MCG tablet Take 137 mcg by mouth daily.           Allergies   Allergies   Allergen Reactions    Metformin Itching and GI Disturbance    Penicillins

## 2024-11-05 NOTE — PLAN OF CARE
Pt is alert and oriented x4. pt denies pain or discomfort. Vitals stable. Pt is on blood sugar checks. No insulin given per sliding scale. Pt is on Cipro and Flagyl antibiotics. Pt had a BM during the shift. Pt is SBA in transfer and cares. Pt refused bed alarm. Pt is sleeping in bed and call light within reach.           Goal Outcome Evaluation:      Plan of Care Reviewed With: patient    Overall Patient Progress: improvingOverall Patient Progress: improving    Outcome Evaluation: pt is alert and oriented x4. pt was given her Cipro and Flagl during the shift.      Problem: Adult Inpatient Plan of Care  Goal: Plan of Care Review  Description: The Plan of Care Review/Shift note should be completed every shift.  The Outcome Evaluation is a brief statement about your assessment that the patient is improving, declining, or no change.  This information will be displayed automatically on your shift  note.  Recent Flowsheet Documentation  Taken 11/5/2024 0150 by Lucas English RN  Outcome Evaluation: pt is alert and oriented x4. pt was given her Cipro and Flagl during the shift.  Taken 11/5/2024 0149 by Lucas English RN  Outcome Evaluation: pt is alert and oriented x4. pt was given her Cipro and Flagl during the shift.  Plan of Care Reviewed With: patient  Overall Patient Progress: improving  Goal: Absence of Hospital-Acquired Illness or Injury  Intervention: Identify and Manage Fall Risk  Recent Flowsheet Documentation  Taken 11/4/2024 2121 by Lucas English, RN  Safety Promotion/Fall Prevention:   safety round/check completed   clutter free environment maintained   increased rounding and observation   nonskid shoes/slippers when out of bed   patient and family education  Intervention: Prevent Skin Injury  Recent Flowsheet Documentation  Taken 11/4/2024 2121 by Lucas English, RN  Body Position: position changed independently  Intervention: Prevent and Manage VTE (Venous  Thromboembolism) Risk  Recent Flowsheet Documentation  Taken 11/4/2024 2121 by Lucas English RN  VTE Prevention/Management: SCDs off (sequential compression devices)  Intervention: Prevent Infection  Recent Flowsheet Documentation  Taken 11/4/2024 2121 by Lucas English RN  Infection Prevention:   single patient room provided   rest/sleep promoted   hand hygiene promoted     Problem: Comorbidity Management  Goal: Blood Glucose Levels Within Targeted Range  Intervention: Monitor and Manage Glycemia  Recent Flowsheet Documentation  Taken 11/4/2024 2121 by Lucas English RN  Medication Review/Management: medications reviewed     Problem: Adult Inpatient Plan of Care  Goal: Plan of Care Review  Description: The Plan of Care Review/Shift note should be completed every shift.  The Outcome Evaluation is a brief statement about your assessment that the patient is improving, declining, or no change.  This information will be displayed automatically on your shift  note.  11/5/2024 0150 by Lucas English RN  Outcome: Progressing  Flowsheets (Taken 11/5/2024 0150)  Outcome Evaluation: pt is alert and oriented x4. pt was given her Cipro and Flagl during the shift.  11/5/2024 0149 by Lucas English RN  Outcome: Progressing  Flowsheets (Taken 11/5/2024 0149)  Outcome Evaluation: pt is alert and oriented x4. pt was given her Cipro and Flagl during the shift.  Plan of Care Reviewed With: patient  Overall Patient Progress: improving  Goal: Absence of Hospital-Acquired Illness or Injury  Intervention: Identify and Manage Fall Risk  Recent Flowsheet Documentation  Taken 11/4/2024 2121 by Lucas English RN  Safety Promotion/Fall Prevention:   safety round/check completed   clutter free environment maintained   increased rounding and observation   nonskid shoes/slippers when out of bed   patient and family education  Intervention: Prevent Skin Injury  Recent Flowsheet  Documentation  Taken 11/4/2024 2121 by Lucas English RN  Body Position: position changed independently  Intervention: Prevent and Manage VTE (Venous Thromboembolism) Risk  Recent Flowsheet Documentation  Taken 11/4/2024 2121 by Lucas English RN  VTE Prevention/Management: SCDs off (sequential compression devices)  Intervention: Prevent Infection  Recent Flowsheet Documentation  Taken 11/4/2024 2121 by Lucas English RN  Infection Prevention:   single patient room provided   rest/sleep promoted   hand hygiene promoted     Problem: Comorbidity Management  Goal: Blood Glucose Levels Within Targeted Range  Intervention: Monitor and Manage Glycemia  Recent Flowsheet Documentation  Taken 11/4/2024 2121 by Lucas English RN  Medication Review/Management: medications reviewed     Problem: Pain Acute  Goal: Optimal Pain Control and Function  Intervention: Prevent or Manage Pain  Recent Flowsheet Documentation  Taken 11/4/2024 2121 by Lucas English RN  Medication Review/Management: medications reviewed

## 2024-11-05 NOTE — PROGRESS NOTES
Dr. Ashton notified of IV leaking during Cipro infusion. Pharmacy consulted by writer per provider request.

## 2024-11-05 NOTE — PLAN OF CARE
"Pt is A&Ox4 and able to make her needs known. Discharge instructions were reviewed with pt and pt verbalizes understanding. No questions or concerns at the time of discharge. Pt educated of importance of fluid intake and diet, pt states understanding. Belongings remained with pt at all times. Discussed medications, all received from pharmacy and signed for. Pt friend providing transportation home. Left unit at around 1630.    Goal Outcome Evaluation:      Plan of Care Reviewed With: patient    Overall Patient Progress: improving    Outcome Evaluation: Pt abdominal cramping relieved by passing a mass of solid formed BM, which pt stated was large for her. Able to ambulate in the hallway without any discomfort. No c/o nausea or vomiting.      Problem: Bowel Elimination Management  Goal: Effective Bowel Elimination/Continence  Outcome: Adequate for Care Transition     Problem: Adult Inpatient Plan of Care  Goal: Plan of Care Review  Description: The Plan of Care Review/Shift note should be completed every shift.  The Outcome Evaluation is a brief statement about your assessment that the patient is improving, declining, or no change.  This information will be displayed automatically on your shift  note.  Outcome: Adequate for Care Transition  Flowsheets (Taken 11/5/2024 3309)  Outcome Evaluation: Pt abdominal cramping relieved by passing a mass of solid formed BM, which pt stated was large for her. Able to ambulate in the hallway without any discomfort. No c/o nausea or vomiting.  Plan of Care Reviewed With: patient  Overall Patient Progress: improving  Goal: Patient-Specific Goal (Individualized)  Description: You can add care plan individualizations to a care plan. Examples of Individualization might be:  \"Parent requests to be called daily at 9am for status\", \"I have a hard time hearing out of my right ear\", or \"Do not touch me to wake me up as it startles  me\".  Outcome: Adequate for Care Transition  Goal: Absence of " Hospital-Acquired Illness or Injury  Outcome: Adequate for Care Transition  Intervention: Identify and Manage Fall Risk  Recent Flowsheet Documentation  Taken 11/5/2024 1257 by Arina Mendoza RN  Safety Promotion/Fall Prevention: safety round/check completed  Taken 11/5/2024 1120 by Arina Mendoza RN  Safety Promotion/Fall Prevention: safety round/check completed  Goal: Optimal Comfort and Wellbeing  Outcome: Adequate for Care Transition  Goal: Readiness for Transition of Care  Outcome: Adequate for Care Transition     Problem: Comorbidity Management  Goal: Blood Glucose Levels Within Targeted Range  Outcome: Adequate for Care Transition  Intervention: Monitor and Manage Glycemia  Recent Flowsheet Documentation  Taken 11/5/2024 1257 by Arina Mendoza RN  Medication Review/Management: medications reviewed     Problem: Infection  Goal: Absence of Infection Signs and Symptoms  Outcome: Adequate for Care Transition     Problem: Pain Acute  Goal: Optimal Pain Control and Function  Outcome: Adequate for Care Transition  Intervention: Prevent or Manage Pain  Recent Flowsheet Documentation  Taken 11/5/2024 1257 by Arina Mendoza RN  Medication Review/Management: medications reviewed  Taken 11/5/2024 1158 by Arina Mendoza RN  Sleep/Rest Enhancement:   awakenings minimized   room darkened

## 2024-11-07 ENCOUNTER — PATIENT OUTREACH (OUTPATIENT)
Dept: CARE COORDINATION | Facility: CLINIC | Age: 69
End: 2024-11-07
Payer: OTHER GOVERNMENT

## 2024-11-07 NOTE — PROGRESS NOTES
"  [facility]: Transitions of Care Outreach  Chief Complaint   Patient presents with    Clinic Care Coordination - Post Hospital       Most Recent Admission Date: 11/2/2024   Most Recent Admission Diagnosis: Acute diverticulitis - K57.92  Sepsis without acute organ dysfunction, due to unspecified organism (H) - A41.9     Most Recent Discharge Date: 11/5/2024   Most Recent Discharge Diagnosis: Acute diverticulitis - K57.92  Sepsis without acute organ dysfunction, due to unspecified organism (H) - A41.9  Slow transit constipation - K59.01     Transitions of Care Assessment    Discharge Assessment  How are you doing now that you are home?: \" I am doing ok just a little tired \"  How are your symptoms? (Red Flag symptoms escalate to triage hotline per guidelines): Improved  Do you know how to contact your clinic care team if you have future questions or changes to your health status? : Yes  Does the patient have their discharge instructions? : Yes  Does the patient have questions regarding their discharge instructions? : No  Were you started on any new medications or were there changes to any of your previous medications? : Yes  Does the patient have all of their medications?: Yes  Do you have questions regarding any of your medications? : No  Do you have all of your needed medical supplies or equipment (DME)?  (i.e. oxygen tank, CPAP, cane, etc.): Yes    Post-op ([facility] Only)  If the patient had a surgery or procedure, do they have any questions for a nurse?: No           Follow up Plan     Discharge Follow-Up  Discharge follow up appointment scheduled in alignment with recommended follow up timeframe or Transitions of Risk Category? (Low = within 30 days; Moderate= within 14 days; High= within 7 days): No  Patient's follow up appointment not scheduled: Patient declined scheduling support. Education on the importance of transitions of care follow up. Provided scheduling phone number.    No future " appointments.    Outpatient Plan as outlined on AVS reviewed with patient.    For any urgent concerns, please contact our 24 hour nurse triage line: [phone] ([phone])       SHERITA Boswell